# Patient Record
Sex: MALE | Race: WHITE | NOT HISPANIC OR LATINO | Employment: FULL TIME | URBAN - METROPOLITAN AREA
[De-identification: names, ages, dates, MRNs, and addresses within clinical notes are randomized per-mention and may not be internally consistent; named-entity substitution may affect disease eponyms.]

---

## 2017-08-18 ENCOUNTER — OFFICE VISIT - HEALTHEAST (OUTPATIENT)
Dept: FAMILY MEDICINE | Facility: CLINIC | Age: 32
End: 2017-08-18
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
## 2017-08-18 ASSESSMENT — MIFFLIN-ST. JEOR: SCORE: 1866.59

## 2017-08-21 ENCOUNTER — COMMUNICATION - HEALTHEAST (OUTPATIENT)
Dept: FAMILY MEDICINE | Facility: CLINIC | Age: 32
End: 2017-08-21

## 2018-02-17 ENCOUNTER — OFFICE VISIT - HEALTHEAST (OUTPATIENT)
Dept: FAMILY MEDICINE | Facility: CLINIC | Age: 33
End: 2018-02-17

## 2018-02-17 DIAGNOSIS — R05.8 POST-VIRAL COUGH SYNDROME: ICD-10-CM

## 2018-07-11 ENCOUNTER — OFFICE VISIT - HEALTHEAST (OUTPATIENT)
Dept: UROLOGY | Facility: CLINIC | Age: 33
End: 2018-07-11

## 2018-07-11 ENCOUNTER — HOSPITAL ENCOUNTER (OUTPATIENT)
Dept: CT IMAGING | Facility: CLINIC | Age: 33
Discharge: HOME | End: 2018-07-11
Attending: PHYSICIAN ASSISTANT

## 2018-07-11 DIAGNOSIS — N20.1 CALCULUS OF URETER: ICD-10-CM

## 2018-07-11 DIAGNOSIS — R10.9 RIGHT FLANK PAIN: ICD-10-CM

## 2018-07-11 DIAGNOSIS — N13.2 HYDRONEPHROSIS WITH URINARY OBSTRUCTION DUE TO URETERAL CALCULUS: ICD-10-CM

## 2018-07-11 DIAGNOSIS — N20.9 URINARY TRACT STONES: ICD-10-CM

## 2018-07-11 DIAGNOSIS — N20.0 CALCULUS OF KIDNEY: ICD-10-CM

## 2018-07-11 LAB
ALBUMIN UR-MCNC: NEGATIVE MG/DL
APPEARANCE UR: CLEAR
BILIRUB UR QL STRIP: NEGATIVE
COLOR UR AUTO: YELLOW
GLUCOSE UR STRIP-MCNC: NEGATIVE MG/DL
HGB UR QL STRIP: ABNORMAL
KETONES UR STRIP-MCNC: NEGATIVE MG/DL
LEUKOCYTE ESTERASE UR QL STRIP: NEGATIVE
NITRATE UR QL: NEGATIVE
PH UR STRIP: 5.5 [PH] (ref 5–8)
SP GR UR STRIP: 1.02 (ref 1–1.03)
UROBILINOGEN UR STRIP-ACNC: ABNORMAL

## 2018-07-15 ENCOUNTER — COMMUNICATION - HEALTHEAST (OUTPATIENT)
Dept: SCHEDULING | Facility: CLINIC | Age: 33
End: 2018-07-15

## 2018-07-16 ENCOUNTER — OFFICE VISIT - HEALTHEAST (OUTPATIENT)
Dept: UROLOGY | Facility: CLINIC | Age: 33
End: 2018-07-16

## 2018-07-16 ENCOUNTER — HOSPITAL ENCOUNTER (OUTPATIENT)
Dept: CT IMAGING | Facility: CLINIC | Age: 33
Discharge: HOME | End: 2018-07-16
Attending: PHYSICIAN ASSISTANT

## 2018-07-16 DIAGNOSIS — N13.2 HYDRONEPHROSIS WITH URINARY OBSTRUCTION DUE TO URETERAL CALCULUS: ICD-10-CM

## 2018-07-16 DIAGNOSIS — N20.1 CALCULUS OF URETER: ICD-10-CM

## 2018-07-16 DIAGNOSIS — N20.0 CALCULUS OF KIDNEY: ICD-10-CM

## 2018-07-16 DIAGNOSIS — N20.9 URINARY TRACT STONES: ICD-10-CM

## 2018-07-16 LAB
ALBUMIN UR-MCNC: NEGATIVE MG/DL
APPEARANCE UR: CLEAR
BILIRUB UR QL STRIP: NEGATIVE
COLOR UR AUTO: YELLOW
GLUCOSE UR STRIP-MCNC: NEGATIVE MG/DL
HGB UR QL STRIP: ABNORMAL
KETONES UR STRIP-MCNC: ABNORMAL MG/DL
LEUKOCYTE ESTERASE UR QL STRIP: ABNORMAL
NITRATE UR QL: NEGATIVE
PH UR STRIP: 5.5 [PH] (ref 5–8)
SP GR UR STRIP: <=1.005 (ref 1–1.03)
UROBILINOGEN UR STRIP-ACNC: ABNORMAL

## 2019-01-23 ENCOUNTER — RECORDS - HEALTHEAST (OUTPATIENT)
Dept: ADMINISTRATIVE | Facility: OTHER | Age: 34
End: 2019-01-23

## 2019-02-26 ENCOUNTER — OFFICE VISIT - HEALTHEAST (OUTPATIENT)
Dept: FAMILY MEDICINE | Facility: CLINIC | Age: 34
End: 2019-02-26

## 2019-02-26 DIAGNOSIS — K21.9 GASTROESOPHAGEAL REFLUX DISEASE WITHOUT ESOPHAGITIS: ICD-10-CM

## 2019-02-26 ASSESSMENT — MIFFLIN-ST. JEOR: SCORE: 1884.73

## 2019-03-21 ENCOUNTER — OFFICE VISIT - HEALTHEAST (OUTPATIENT)
Dept: UROLOGY | Facility: CLINIC | Age: 34
End: 2019-03-21

## 2019-03-21 DIAGNOSIS — N23 RENAL COLIC: ICD-10-CM

## 2019-03-21 DIAGNOSIS — N20.1 CALCULUS OF URETER: ICD-10-CM

## 2019-03-21 DIAGNOSIS — N20.0 CALCULUS OF KIDNEY: ICD-10-CM

## 2019-04-04 ENCOUNTER — HOSPITAL ENCOUNTER (OUTPATIENT)
Dept: CT IMAGING | Facility: CLINIC | Age: 34
Discharge: HOME | End: 2019-04-04
Attending: PHYSICIAN ASSISTANT

## 2019-04-04 ENCOUNTER — OFFICE VISIT - HEALTHEAST (OUTPATIENT)
Dept: UROLOGY | Facility: CLINIC | Age: 34
End: 2019-04-04

## 2019-04-04 DIAGNOSIS — N20.1 CALCULUS OF URETER: ICD-10-CM

## 2019-04-04 DIAGNOSIS — N20.0 CALCULUS OF KIDNEY: ICD-10-CM

## 2019-04-04 DIAGNOSIS — N13.2 HYDRONEPHROSIS WITH URINARY OBSTRUCTION DUE TO URETERAL CALCULUS: ICD-10-CM

## 2019-04-04 LAB
ALBUMIN UR-MCNC: NEGATIVE MG/DL
APPEARANCE UR: CLEAR
BILIRUB UR QL STRIP: NEGATIVE
COLOR UR AUTO: YELLOW
GLUCOSE UR STRIP-MCNC: NEGATIVE MG/DL
HGB UR QL STRIP: NEGATIVE
KETONES UR STRIP-MCNC: ABNORMAL MG/DL
LEUKOCYTE ESTERASE UR QL STRIP: NEGATIVE
NITRATE UR QL: NEGATIVE
PH UR STRIP: 5.5 [PH] (ref 5–8)
SP GR UR STRIP: 1.02 (ref 1–1.03)
UROBILINOGEN UR STRIP-ACNC: ABNORMAL

## 2019-04-18 ENCOUNTER — OFFICE VISIT - HEALTHEAST (OUTPATIENT)
Dept: UROLOGY | Facility: CLINIC | Age: 34
End: 2019-04-18

## 2019-04-18 ENCOUNTER — HOSPITAL ENCOUNTER (OUTPATIENT)
Dept: CT IMAGING | Facility: CLINIC | Age: 34
Discharge: HOME | End: 2019-04-18
Attending: PHYSICIAN ASSISTANT

## 2019-04-18 DIAGNOSIS — N13.2 HYDRONEPHROSIS WITH URINARY OBSTRUCTION DUE TO URETERAL CALCULUS: ICD-10-CM

## 2019-04-18 DIAGNOSIS — N20.1 CALCULUS OF URETER: ICD-10-CM

## 2019-04-18 LAB
ALBUMIN UR-MCNC: NEGATIVE MG/DL
APPEARANCE UR: CLEAR
BILIRUB UR QL STRIP: NEGATIVE
COLOR UR AUTO: YELLOW
GLUCOSE UR STRIP-MCNC: NEGATIVE MG/DL
HGB UR QL STRIP: NEGATIVE
KETONES UR STRIP-MCNC: NEGATIVE MG/DL
LEUKOCYTE ESTERASE UR QL STRIP: NEGATIVE
NITRATE UR QL: NEGATIVE
PH UR STRIP: 5.5 [PH] (ref 5–8)
SP GR UR STRIP: 1.01 (ref 1–1.03)
UROBILINOGEN UR STRIP-ACNC: NORMAL

## 2019-04-19 ENCOUNTER — ANESTHESIA - HEALTHEAST (OUTPATIENT)
Dept: SURGERY | Facility: CLINIC | Age: 34
End: 2019-04-19

## 2019-04-19 ENCOUNTER — SURGERY - HEALTHEAST (OUTPATIENT)
Dept: SURGERY | Facility: CLINIC | Age: 34
End: 2019-04-19

## 2019-04-19 ASSESSMENT — MIFFLIN-ST. JEOR: SCORE: 1821.23

## 2019-04-30 ENCOUNTER — ANESTHESIA - HEALTHEAST (OUTPATIENT)
Dept: SURGERY | Facility: CLINIC | Age: 34
End: 2019-04-30

## 2019-04-30 ENCOUNTER — SURGERY - HEALTHEAST (OUTPATIENT)
Dept: SURGERY | Facility: CLINIC | Age: 34
End: 2019-04-30

## 2019-04-30 ASSESSMENT — MIFFLIN-ST. JEOR: SCORE: 1814.71

## 2019-05-03 ENCOUNTER — COMMUNICATION - HEALTHEAST (OUTPATIENT)
Dept: UROLOGY | Facility: CLINIC | Age: 34
End: 2019-05-03

## 2019-05-03 ENCOUNTER — COMMUNICATION - HEALTHEAST (OUTPATIENT)
Dept: SCHEDULING | Facility: CLINIC | Age: 34
End: 2019-05-03

## 2019-05-04 ENCOUNTER — COMMUNICATION - HEALTHEAST (OUTPATIENT)
Dept: UROLOGY | Facility: CLINIC | Age: 34
End: 2019-05-04

## 2019-05-06 ENCOUNTER — AMBULATORY - HEALTHEAST (OUTPATIENT)
Dept: UROLOGY | Facility: CLINIC | Age: 34
End: 2019-05-06

## 2019-05-06 DIAGNOSIS — N20.1 CALCULUS OF URETER: ICD-10-CM

## 2019-05-06 LAB
ALBUMIN UR-MCNC: ABNORMAL MG/DL
APPEARANCE UR: CLEAR
BILIRUB UR QL STRIP: NEGATIVE
COLOR UR AUTO: YELLOW
GLUCOSE UR STRIP-MCNC: NEGATIVE MG/DL
HGB UR QL STRIP: ABNORMAL
KETONES UR STRIP-MCNC: NEGATIVE MG/DL
LEUKOCYTE ESTERASE UR QL STRIP: ABNORMAL
NITRATE UR QL: NEGATIVE
PH UR STRIP: 6 [PH] (ref 5–8)
SP GR UR STRIP: <=1.005 (ref 1–1.03)
UROBILINOGEN UR STRIP-ACNC: ABNORMAL

## 2019-05-07 LAB — BACTERIA SPEC CULT: NO GROWTH

## 2019-06-10 ENCOUNTER — OFFICE VISIT - HEALTHEAST (OUTPATIENT)
Dept: UROLOGY | Facility: CLINIC | Age: 34
End: 2019-06-10

## 2019-06-10 ENCOUNTER — HOSPITAL ENCOUNTER (OUTPATIENT)
Dept: CT IMAGING | Facility: CLINIC | Age: 34
Discharge: HOME | End: 2019-06-10
Attending: SPECIALIST

## 2019-06-10 DIAGNOSIS — N20.0 CALCULUS OF KIDNEY: ICD-10-CM

## 2019-06-10 DIAGNOSIS — N20.1 CALCULUS OF URETER: ICD-10-CM

## 2019-06-10 LAB
ALBUMIN UR-MCNC: NEGATIVE MG/DL
APPEARANCE UR: CLEAR
BILIRUB UR QL STRIP: NEGATIVE
COLOR UR AUTO: YELLOW
GLUCOSE UR STRIP-MCNC: NEGATIVE MG/DL
HGB UR QL STRIP: NEGATIVE
KETONES UR STRIP-MCNC: NEGATIVE MG/DL
LEUKOCYTE ESTERASE UR QL STRIP: NEGATIVE
NITRATE UR QL: NEGATIVE
PH UR STRIP: 5.5 [PH] (ref 5–8)
SP GR UR STRIP: 1.02 (ref 1–1.03)
UROBILINOGEN UR STRIP-ACNC: NORMAL

## 2020-05-21 ENCOUNTER — OFFICE VISIT - HEALTHEAST (OUTPATIENT)
Dept: FAMILY MEDICINE | Facility: CLINIC | Age: 35
End: 2020-05-21

## 2020-05-21 DIAGNOSIS — R45.89 DEPRESSED MOOD: ICD-10-CM

## 2020-05-21 DIAGNOSIS — K42.9 UMBILICAL HERNIA WITHOUT OBSTRUCTION AND WITHOUT GANGRENE: ICD-10-CM

## 2020-05-21 DIAGNOSIS — I83.92 VARICOSE VEINS OF LEFT LOWER EXTREMITY, UNSPECIFIED WHETHER COMPLICATED: ICD-10-CM

## 2020-05-21 DIAGNOSIS — L72.3 SEBACEOUS CYST: ICD-10-CM

## 2020-05-21 ASSESSMENT — ANXIETY QUESTIONNAIRES
3. WORRYING TOO MUCH ABOUT DIFFERENT THINGS: MORE THAN HALF THE DAYS
6. BECOMING EASILY ANNOYED OR IRRITABLE: MORE THAN HALF THE DAYS
2. NOT BEING ABLE TO STOP OR CONTROL WORRYING: MORE THAN HALF THE DAYS
IF YOU CHECKED OFF ANY PROBLEMS ON THIS QUESTIONNAIRE, HOW DIFFICULT HAVE THESE PROBLEMS MADE IT FOR YOU TO DO YOUR WORK, TAKE CARE OF THINGS AT HOME, OR GET ALONG WITH OTHER PEOPLE: SOMEWHAT DIFFICULT
GAD7 TOTAL SCORE: 11
5. BEING SO RESTLESS THAT IT IS HARD TO SIT STILL: MORE THAN HALF THE DAYS
1. FEELING NERVOUS, ANXIOUS, OR ON EDGE: MORE THAN HALF THE DAYS
7. FEELING AFRAID AS IF SOMETHING AWFUL MIGHT HAPPEN: NOT AT ALL
4. TROUBLE RELAXING: SEVERAL DAYS

## 2020-05-21 ASSESSMENT — PATIENT HEALTH QUESTIONNAIRE - PHQ9: SUM OF ALL RESPONSES TO PHQ QUESTIONS 1-9: 6

## 2020-05-21 ASSESSMENT — MIFFLIN-ST. JEOR: SCORE: 1929.18

## 2020-05-21 NOTE — ASSESSMENT & PLAN NOTE
Patient describes some mild long term symptoms but he has usually been able to manage with lifestyle. I suspect symptoms have worseneddue to stress related to ongoing pandemic. We discussed that alcohol acts chemically as a depressant and is likely causing worsening of his symptoms. I am recommending cessation of alcohol use until his symptoms have improved and then a limit of 7-14 drinks per week. He feels he can do this without any outside assistance. We discussed treatment options. He is not interested in counseling. Start sertraline 25 mg daily for 7 days, then 50mg daily. He was also given information on lifestyle changes. Follow up in 8 weeks.

## 2020-06-19 ENCOUNTER — OFFICE VISIT - HEALTHEAST (OUTPATIENT)
Dept: OTOLARYNGOLOGY | Facility: CLINIC | Age: 35
End: 2020-06-19

## 2020-06-19 ENCOUNTER — OFFICE VISIT - HEALTHEAST (OUTPATIENT)
Dept: SURGERY | Facility: CLINIC | Age: 35
End: 2020-06-19

## 2020-06-19 DIAGNOSIS — K42.9 UMBILICAL HERNIA WITHOUT OBSTRUCTION AND WITHOUT GANGRENE: ICD-10-CM

## 2020-06-19 DIAGNOSIS — L72.3 SEBACEOUS CYST: ICD-10-CM

## 2020-06-22 ENCOUNTER — COMMUNICATION - HEALTHEAST (OUTPATIENT)
Dept: SURGERY | Facility: CLINIC | Age: 35
End: 2020-06-22

## 2020-06-22 ENCOUNTER — COMMUNICATION - HEALTHEAST (OUTPATIENT)
Dept: OTOLARYNGOLOGY | Facility: CLINIC | Age: 35
End: 2020-06-22

## 2020-06-22 DIAGNOSIS — T81.40XA POST OP INFECTION: ICD-10-CM

## 2020-06-23 ENCOUNTER — COMMUNICATION - HEALTHEAST (OUTPATIENT)
Dept: SURGERY | Facility: CLINIC | Age: 35
End: 2020-06-23

## 2020-06-23 ENCOUNTER — AMBULATORY - HEALTHEAST (OUTPATIENT)
Dept: SURGERY | Facility: CLINIC | Age: 35
End: 2020-06-23

## 2020-06-23 ENCOUNTER — COMMUNICATION - HEALTHEAST (OUTPATIENT)
Dept: ADMINISTRATIVE | Facility: CLINIC | Age: 35
End: 2020-06-23

## 2020-06-23 DIAGNOSIS — Z11.59 ENCOUNTER FOR SCREENING FOR OTHER VIRAL DISEASES: ICD-10-CM

## 2020-06-23 LAB
LAB AP CHARGES (HE HISTORICAL CONVERSION): NORMAL
PATH REPORT.COMMENTS IMP SPEC: NORMAL
PATH REPORT.FINAL DX SPEC: NORMAL
PATH REPORT.GROSS SPEC: NORMAL
PATH REPORT.MICROSCOPIC SPEC OTHER STN: NORMAL
PATH REPORT.RELEVANT HX SPEC: NORMAL
RESULT FLAG (HE HISTORICAL CONVERSION): NORMAL

## 2020-06-24 ENCOUNTER — OFFICE VISIT - HEALTHEAST (OUTPATIENT)
Dept: OTOLARYNGOLOGY | Facility: CLINIC | Age: 35
End: 2020-06-24

## 2020-06-24 DIAGNOSIS — L03.221 CELLULITIS OF NECK: ICD-10-CM

## 2020-06-26 ENCOUNTER — COMMUNICATION - HEALTHEAST (OUTPATIENT)
Dept: SURGERY | Facility: CLINIC | Age: 35
End: 2020-06-26

## 2021-05-26 ASSESSMENT — PATIENT HEALTH QUESTIONNAIRE - PHQ9: SUM OF ALL RESPONSES TO PHQ QUESTIONS 1-9: 6

## 2021-05-27 NOTE — PATIENT INSTRUCTIONS - HE
Patient Stated Goal: Know what to expect after surgery  Ureteroscopy    Ureteroscopy is a procedure which is done for clearance of stones from the ureter, kidney or both. There are no incisions involved. The procedure involves your surgeon placing a small scope into your urethra. This is the opening where urine leaves your body.  The surgeon watches as they carefully guide the scope to the stone(s).  Modern flexible ureteroscopes can be used to reach virtually any location within the urinary tract.     The size, shape and location of the stone determines how best to treat the stone(s).  Whenever possible, stones are removed in one piece.  Larger stones need to be broken using a laser before removing in smaller pieces.  The goal is to remove all stones and stone fragments from that side of the body in a single treatment.  Complete stone clearance is an important step to prevent future kidney stone episodes.    Surgery:    Same day outpatient procedure    30-60 minutes    Procedure done in hospital surgical suite    General anesthesia (you will be asleep during the procedure)     Antibiotic prior to surgery to prevent infection    Physician will visit with you and respond to any questions or concerns and consent will be signed prior to going to the operating room    Risks:    Infection - Preoperative antibiotics should prevent new infections but it is possible that unanticipated bacteria may be introduced at time of surgery or that the stones were actually chronically infected before surgery      Injury - The ureter may be injured during this procedure.  This is most likely to happen if the ureter was very inflamed before surgery or if a stone is very tightly impacted.  The surgeon will not aggressively treat a stone if this creates a risk of injury.        Inaccessible Stones -A single procedure is effective in 95% of cases, but if your ureter is very narrow or your kidney stone is very impacted, a stent will be  placed and the procedure stopped.  In 1-2 weeks after the ureter has relaxed, the patient will be brought back to surgery and the procedure can be safely performed.      Incomplete stone clearance -Occasionally stone or stone fragments may not be completely cleared.  These may pass on their own, which may cause discomfort.  Our goal is to remove all possible stones and fragments.    Stent:      An internal soft tube will be placed between the kidney and the bladder while in surgery (after the stone is cleared). The stent will keep the kidney draining.    What should I expect?     It is common for a stent to cause some irritation and discomfort.   You may have:      The need to urinate suddenly     The need to urinate often     Pain during urination     A dull backache, which may get worse during urination     Blood stained urine (like fruit punch) and occasional small clots    It s important to remember the stent is necessary and only temporary. To feel more comfortable:      Drink more than you normally would but you do not have to constantly  flush your kidneys     Limiting your activity may decrease irritation or bleeding    Ibuprofen - 2 tablets every 6-8 hours     Use pain medications as directed.    When is the stent removed?    Most stents are removed within 5 days to 2 weeks after a procedure.     How is the stent removed?     Your stent will be removed in [facility] with a small telescope and a grasping tool.  It usually takes less than 1 minute to remove the stent.    What should I expect after the stent is removed?     You should feel normal by the next day    Some patients find:    An increase in back pain about an hour after the stent is removed as the kidney fills up with urine before it starts to empty.  It can be as uncomfortable as your initial stone episode.  Taking pain medications before stent removal may be helpful, but you would need someone else to drive you to and from your  appointment.    Bladder symptoms usually disappear by the next morning.    Small amounts of blood in the urine may be seen occasionally for up to a week.    Diet:      After surgery, there are no dietary restrictions - Drink to thirst, there is no need to increase intake of fluids, as this may increase nausea symptoms. Try to eat smaller, more frequent snacks, instead of large meals.    Activity:    Many people return to work within 1-2 days. Fatigue is normal for a couple of weeks following surgery. With increased activity you may experience more discomfort and you may notice more blood in your urine.      Post-Operative Symptom Control    While you recover from your procedure, you can take steps to ease your recovery.    Medications that prevent further episodes of severe pain and help stones pass: Take these as prescribed on a regular basis even if you are NOT in pain      Ibuprofen (Advil or Motrin) - Is available over the counter Take 2 (200mg) tablets every 6 hours until the stone passes.  o prevents spasm of the ureter.    o Decreases pain      Dramamine - (drowsy version, non-generic formulation) Is available over the counter and decreases spasm of the ureter.  Take 50mg at bedtime every night until the stone passes. In addition, take every 6 hours as needed.  Dramamine:  o Decreases nausea  o Decreases acute pain  o Decreases recurrence of pain for next 24 hours  o Will help you sleep        *This medication will cause increased drowsiness, do not drive or operate machinery for 6 hours      Flomax- Studies show that Flomax decreases irritation from stents.   o Take every day with food until stone passes even if you do not have pain  o Flomax does not relieve pain.        *This medication may cause nasal congestion or light-headedness      Detrol ( Tolterodine) - After surgery Detrol may decrease stent irritation and pelvic pain  o Take as prescribed     *This medication may cause dry mouth, constipation or  blurry vision. Stop medication if unable to urinate.    Medication that are taken as needed to manage break through symptoms: Take these ONLY as required and hopefully not at all      Narcotics (Percocet, Vicodin, Dilaudid)- take as prescribed for severe pain unrelieved by ibuprofen and dramamine  o Take as prescribed for severe pain  o Narcotics have significant side effects and only  cover-up  pain. They have no effect on cause of pain.  o Common side effects:  - Confusion, disorientation and sedation - DO NOT DRIVE OR OPERATE MACHINERY WITHIN 24 HOURS  - Nausea - take Dramamine or Zofran  or Haldol to help control  - Constipation  - Sleep disturbances      Ondansetron (Zofran)-  o Take as prescribed  o Reserve for severe nausea  o May cause constipation, start over the counter Miralax if needed to treat this    Haldol-  o Take as prescribed  o Reserve for severe nausea    Warning Signs/Symptoms - Please call the [facility] 24 hours a day at [phone] IMMEDIATELY if you experience any of these:    Fever greater than 100.1     Chills    Pain NOT CONTROLLED by pain medications    Heavy bleeding or large clots in urine (small clots can be normal)    Persistent nausea and/or vomiting    Post-Operative Follow up:    The stone(s) will be sent from surgery to a lab for composition analysis.  These results are usually available before a one month post-operative visit.  If you had laser treatment to break up your stone, you will usually be scheduled for a low dose CT scan prior to your one month appointment.  This scan allows your surgeon to confirm that all stone fragments were cleared at time of surgery and that there have been no complications.  These results along with possible labs and urine studies will help us develop an individualized plan to prevent new stones from forming and keep existing stones from enlarging.  This visit is usually scheduled about 1 month after your original surgery.    The  [facility] can respond to your questions or concerns 24 hours a day at [phone].

## 2021-05-27 NOTE — PROGRESS NOTES
Assessment/Plan:        Diagnoses and all orders for this visit:    Calculus of ureter  -     Urinalysis Macroscopic  -     Symptom Control While Passing a Stone Education  -     CT Abdomen Pelvis Without Oral Without IV Contrast; Future; Expected date: 04/18/2019  -     Patient Stated Goal: Pass my stone  -     oxyCODONE (ROXICODONE) 5 MG immediate release tablet; 1-2 tablets every four hours as required for severe pain  Dispense: 20 tablet; Refill: 0  -     tamsulosin (FLOMAX) 0.4 mg cap; 1 tablet (0.4 mg) daily with food  Dispense: 14 capsule; Refill: 2    Calculus of kidney    Hydronephrosis with urinary obstruction due to ureteral calculus      Stone Management Plan  [facility] Stone [facility] 7/16/2018 3/21/2019 4/4/2019   Urinary Tract Infection No suspicion of infection No suspicion of infection No suspicion of infection   Renal Colic Asymptomatic at this time Inadequate outpatient management of symptoms Well controlled symptoms   Renal Failure No suspicion of renal failure No suspicion of renal failure No suspicion of renal failure   Current CT date 7/16/2018 3/20/2019 4/4/2019   Right sided stones? Yes Yes No   R Number of ureteral stones 1 No ureteral stones No ureteral stones   R GSD of ureteral stones 3 - -   R Location of ureteral stone Distal - -   R Number of kidney stones  Renal stones unchanged from last exam Renal stones unchanged from last exam -   R GSD of kidney stones < 2 < 2 -   R Hydronephrosis Mild None None   R Stone Event Established event No current event No current event   Diagnosis date - - -   Initial location of primary symptomatic stone - - -   Initial GSD of primary symptomatic stone - - -   R MET status Progression - -   R Current Plan MET Observe -   MET 2 week F/U - -   Observe rationale - Limited stone burden with good prognosis for spontaneous passage -   Left sided stones? Yes Yes Yes   L Number of ureteral stones No ureteral stones 1 2   L GSD of ureteral stones - 5 5   L Location  of ureteral stone - Proximal Proximal   L Number of kidney stones  Renal stones unchanged from last exam 2 1   L GSD of kidney stones 4 - 10 2 - 4 < 2   L Hydronephrosis None Mild Mild   L Stone Event No current event New event Established event   Diagnosis date - 3/20/2019 -   Initial location of primary symptomatic stone - Proximal -   Initial GSD of primary symptomatic stone - 5 -   L MET Status - Initiation No progression   L Current Plan Observe MET MET   MET - 2 week F/U 2 week F/U   Observe rationale Limited stone burden with good prognosis for spontaneous passage - -         Subjective:      HPI  Mr. Segundo Wilson is a 33 y.o.  male returning to the [facility] for medical expulsive therapy follow up.     On last encounter, his 5 mm stone was in left proximal ureter with mild hydronephrosis. He has had no unanticipated events.    Symptoms have been controlled. He had some pain episodes over the weekend but has not seen a stone pass. He is asymptomatic at present. He denies symptoms of fever, chills, flank pain, nausea, vomiting, urinary frequency and dysuria.     New CT scan was personally reviewed and now demonstrates 2 left ureteral stones. There has been no progression of previous 5 mm proximal stone. Also, previous 3 mm left lower pole stone now within proximal ureter. Solitary 1 mm left lower pole renal stone. No right sided stones appreciated.    PLAN    34 yo M with hx of rapidly recurrent calcium oxalate stone disease. Interval migration of left renal stone, now with 2 left proximal ureteral stones, stable associated hydronephrosis. Small, bilateral renal stones.    He will continue to attempt to pass stone and will return in 2 weeks with further imaging.     Patient also seen and examined by DARREN Bill   Review of systems is negative except for HPI.    Past Medical History:   Diagnosis Date     GERD (gastroesophageal reflux disease)      Kidney stones         Past Surgical History:   Procedure Laterality Date     SC ESOPHAGOGASTRIC FUNDOPLASTY      Description: Esophagogastric Fundoplasty Nissen Fundoplication;  Recorded: 03/18/2009;       Current Outpatient Medications   Medication Sig Dispense Refill     dimenhyDRINATE (DRAMAMINE) 50 MG tablet Take 50 mg by mouth at bedtime as needed.       ibuprofen (ADVIL,MOTRIN) 400 MG tablet Take 400 mg by mouth every 6 (six) hours as needed for pain.       oxyCODONE (ROXICODONE) 5 MG immediate release tablet Take 1-2 tablets (5-10 mg total) by mouth every 4 (four) hours as needed for pain. 10 tablet 0     tamsulosin (FLOMAX) 0.4 mg cap Take 1 capsule (0.4 mg total) by mouth daily for 14 days. 14 capsule 1     oxyCODONE (ROXICODONE) 5 MG immediate release tablet 1-2 tablets every four hours as required for severe pain 20 tablet 0     ranitidine (ZANTAC) 150 MG tablet Take 1 tablet (150 mg total) by mouth 2 (two) times a day. 180 tablet 1     tamsulosin (FLOMAX) 0.4 mg cap 1 tablet (0.4 mg) daily with food 14 capsule 2     No current facility-administered medications for this visit.        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     Employer: [employer]   Social Needs     Financial resource strain: Not on file     Food insecurity:     Worry: Not on file     Inability: Not on file     Transportation needs:     Medical: Not on file     Non-medical: Not on file   Tobacco Use     Smoking status: Never Smoker     Smokeless tobacco: Never Used   Substance and Sexual Activity     Alcohol use: Yes     Comment: occ social drink     Drug use: No     Sexual activity: Yes     Partners: Female     Birth control/protection: None   Lifestyle     Physical activity:     Days per week: Not  on file     Minutes per session: Not on file     Stress: Not on file   Relationships     Social connections:     Talks on phone: Not on file     Gets together: Not on file     Attends Anabaptism service: Not on file     Active member of club or organization: Not on file     Attends meetings of clubs or organizations: Not on file     Relationship status: Not on file     Intimate partner violence:     Fear of current or ex partner: Not on file     Emotionally abused: Not on file     Physically abused: Not on file     Forced sexual activity: Not on file   Other Topics Concern     Not on file   Social History Narrative     Not on file       Family History   Problem Relation Age of Onset     Diabetes Father      Urolithiasis Father         single stone     Clotting disorder Neg Hx      Gout Neg Hx      Cancer Neg Hx      Heart disease Neg Hx      Objective:      Physical Exam  Vitals:    04/04/19 0831   BP: (!) 136/98   Pulse: 65   Temp: 97.6  F (36.4  C)     General - well developed, well nourished, appropriate for age. Appears no distress at this time  Abdomen - soft, non-tender, no hepatosplenomegaly, no masses.   - no flank tenderness, no suprapubic tenderness, kidney and bladder non-palpable  MSK - normal spinal curvature. no spinal tenderness. normal gait. muscular strength intact.  Psych - oriented to time, place, and person, normal mood and affect.      Labs   Urinalysis POC (Office):  Blood UA   Date Value Ref Range Status   09/17/2015 Negative Negative Final     Nitrite, UA   Date Value Ref Range Status   04/04/2019 Negative Negative Final   03/20/2019 Negative Negative Final   07/16/2018 Negative Negative Final     Leukocytes, UA    Date Value Ref Range Status   09/17/2015 Negative Negative Final     pH UA   Date Value Ref Range Status   09/17/2015 5.5 4.5 - 8.0 Final       Lab Urinalysis:  Blood, UA   Date Value Ref Range Status   04/04/2019 Negative Negative Final   03/20/2019 Small (!) Negative Final    07/16/2018 Trace (!) Negative Final     Nitrite, UA   Date Value Ref Range Status   04/04/2019 Negative Negative Final   03/20/2019 Negative Negative Final   07/16/2018 Negative Negative Final     Leukocytes, UA   Date Value Ref Range Status   04/04/2019 Negative Negative Final   03/20/2019 Negative Negative Final   07/16/2018 Trace (!) Negative Final     pH, UA   Date Value Ref Range Status   04/04/2019 5.5 5.0 - 8.0 Final   03/20/2019 6.0 4.5 - 8.0 Final   07/16/2018 5.5 5.0 - 8.0 Final

## 2021-05-27 NOTE — PATIENT INSTRUCTIONS - HE
Patient Stated Goal: Pass my stone  Symptom Control While Passing A Stone    The goal of [facility] is to let a smaller kidney stone (less than 4 to 5 mm) pass without intervention if possible. Giving your body a chance to clear the stone may take a few hours up to a few weeks.  Keeping you well-informed, safe and fairly comfortable is important.    Drink to thirst  Do not attempt to  flush out  your stone by drinking too much fluid. This does not work and may increase nausea. Drink enough to satisfy your body s thirst. Eating your normal diet is fine.   Medications (that may be suggested or prescribed)    Ibuprofen (Advil or Motrin) Available over the counter  o Take two (200mg) tablets every six hours until the stone passes.  o Prevents spasm of the ureter.    o Decreases pain.      Dramamine* (drowsy version, non-generic formulation) Available over the counter  o Take 50mg at bedtime  o Decreases spasms of the ureter  o Decreases nausea  o Decreases acute pain  o Decreases recurrence of pain for 24 hours  o Will help you sleep  *This medication will cause increase drowsiness, do not drive or operate machinery for 6 hours.      Narcotics (Percocet, Vicodin, Dilaudid) Take as prescribed for severe pain unrelieved by ibuprofen and Dramamine  o Narcotics have significant side effects and only  cover-up  pain. They have no effect on the cause of pain.  o Common side effects  - Confusion, disorientation and sedation - DO NOT DRIVE OR OPERATE MACHINERY WITHIN 24 HOURS  - Nausea - take Dramamine or Zofran or Haldol to help control  - Constipation  - Sleep disturbances      Ondansetron (Zofran) Take as prescribed  o Reserve for severe nausea  o May cause constipation, start over the counter Miralax if needed      Second Line Anti-Nausea Medication: Adding a different anti-nausea medication maybe helpful for persistent nausea.  The combined effect of different types of anti-nausea medications maybe more  effective than either medication by itself, even in higher doses.  o Compazine: Take as prescribed      Information about kidney stones    Crystals can form if chemicals are too concentrated in your urine. If the crystal grows over time, a stone may form. A stone usually isn t painful while it is still in the kidney.    As the stone begins to leave the kidney, you may experience episodes of flank pain as the kidney stone approaches the entrance to the ureter. Some people feel a vague ache in the side.    Kidney stones may fall into the ureter. Some stones are tiny and pass through without causing symptoms. The ureter is a small tube (approximately 1/8 of an inch wide). A kidney stone can get stuck and block the ureter. If this happens, urine backs up and flows back to the kidney. Back pressure on the kidney can cause:  o Severe flank pain radiating to the groin.  o Severe nausea and vomiting.  o The pain can occur in the lower back, side, groin or all three.      When the stone reaches the lower ureter, this can irritate the bladder and sensations of feeling the urge to urinate frequently and urgently may occur.      Once the stone passes out of your ureter and into your bladder, the symptoms of urgency and frequency will often disappear. Sometimes pain will come back for a short period and will not be as severe as before. The passage of the stone from your bladder and out of your body is usually not a problem. The urethra is at least twice as wide as the normal ureter, so the stone doesn t usually block it.    Strain all urine  If you pass the stone, save it. Place it in the container we have provided and bring it to the [facility] within a week of passing it. Your stone will then be sent for analysis which takes about a month.     Signs and symptoms you might experience    Nausea    Decreased appetite    Urinary frequency    Bloody urine     Chills    Fatigue    When to call [facility] or  go to the Emergency Room    Fever with a temperature greater than 100.1    Severe pain    Persistent nausea/vomiting    If the pain worsens or nausea/vomiting is uncontrolled with medications, STOP eating & drinking. You need to have an empty stomach for 8 hours prior to surgery. Call the [facility] immediately at [phone].           Follow-up    Low dose CT scan with doctor visit 1-2 weeks after initial clinic visit per doctor s instructions    Please cancel the CT scan visit if you pass a stone. Reschedule for a one month follow-up with doctor to discuss stone composition and future prevention.    Preventing future stones    Approximately a month after your stone is sent out for analysis, a prevention visit will occur with your provider, to discuss an individualized plan for prevention of new stones and to discuss managing stones that you may still have. Along with the analysis of the kidney stone, blood and urine tests may be indicated to develop this plan. Knowing the type of kidney stones you make, and why, allows the providers at the [facility] to recommend specific ways to prevent them.    Follow-up visits are an important part of monitoring and preventing future re-occurrences.    The [facility] is available for questions or concerns 24 hours a day at [phone]

## 2021-05-27 NOTE — PROGRESS NOTES
Assessment/Plan:        Diagnoses and all orders for this visit:    Calculus of ureter  -     Urinalysis Macroscopic  -     Verify informed consent; Standing  -     Diet NPO; Standing  -     Place sequential compression device; Standing  -     XR Retrograde Pyelogram W or WO KUB Intraoperative; Standing  -     levoFLOXacin 500 mg/100 mL IVPB 500 mg (LEVAQUIN)  -     ketorolac injection 15 mg (TORADOL)  -     acetaminophen tablet 1,000 mg (TYLENOL)  -     sterile water IR irrigation solution 3,000 mL  -     Patient Stated Goal: Know what to expect after surgery  -     Ureteroscopy Education    Hydronephrosis with urinary obstruction due to ureteral calculus      Stone Management Plan  [facility] Stone Management 3/21/2019 4/4/2019 4/18/2019   Urinary Tract Infection No suspicion of infection No suspicion of infection No suspicion of infection   Renal Colic Inadequate outpatient management of symptoms Well controlled symptoms Asymptomatic at this time   Renal Failure No suspicion of renal failure No suspicion of renal failure No suspicion of renal failure   Current CT date 3/20/2019 4/4/2019 4/18/2019   Right sided stones? Yes No Yes   R Number of ureteral stones No ureteral stones No ureteral stones No ureteral stones   R GSD of ureteral stones - - -   R Location of ureteral stone - - -   R Number of kidney stones  Renal stones unchanged from last exam - 1   R GSD of kidney stones < 2 - < 2   R Hydronephrosis None None None   R Stone Event No current event No current event No current event   Diagnosis date - - -   Initial location of primary symptomatic stone - - -   Initial GSD of primary symptomatic stone - - -   R MET status - - -   R Current Plan Observe - Observe   MET - - -   Observe rationale Limited stone burden with good prognosis for spontaneous passage - Limited stone burden with good prognosis for spontaneous passage   Left sided stones? Yes Yes Yes   L Number of ureteral stones 1 2 3   L GSD of ureteral stones  5 5 5   L Location of ureteral stone Proximal Proximal Proximal   L Number of kidney stones  2 1 No renal stones   L GSD of kidney stones 2 - 4 < 2 N/A   L Hydronephrosis Mild Mild Mild   L Stone Event New event Established event Established event   Diagnosis date 3/20/2019 - -   Initial location of primary symptomatic stone Proximal - -   Initial GSD of primary symptomatic stone 5 - -   L MET Status Initiation No progression No progression   L Current Plan MET MET Clear   MET 2 week F/U 2 week F/U -   Clear rationale - - Poor prognosis   Observe rationale - - -             Subjective:      HPI  Mr. Segundo Wilson is a 33 y.o.  male returning to the [facility] for medical expulsive therapy follow up.     On last encounter, imaging noted 5 mm and 3 mm stones in left proximal ureter with mild hydronephrosis. He has had no unanticipated events.    Symptoms have been controlled with medication and he is able to carry on normal activities. He has some pain events this last week but has not seen any stones pass. He is asymptomatic at present. Pertinent negative current symptoms include:  fever, chills, right flank pain, nausea, vomiting, hematuria, urinary frequency and dysuria.     New CT scan was personally reviewed and demonstrates no progression of most distal left proximal ureteral stone with stable mild hydronephrosis. Previous 1 mm left lower pole renal stone has migrated now sitting within proximal left ureter ~ 1 cm above the persistent 2 proximal ureteral stones.    PLAN    32 yo M with hx of rapidly recurrent calcium oxalate stone disease.  Interval migration of another left renal stone, now with 3 left proximal ureteral stones, stable associated mild hydronephrosis. Tiny right renal stone.    He has failed adequate duration of medical expulsive therapy and will proceed to the operating room for ureteroscopic stone clearance. Risks and benefits were detailed of ureteroscopic  stone clearance including potential issues of urinary or systemic infection, ureteral injury, inaccessible stone, incomplete stone clearance, multiple surgeries, and stent related symptoms of urgency, frequency and hematuria. Current documentation is adequate for preoperative history and physical.     He has flomax refill available.    Patient also seen and examined by Lilliana Bowling PA-C       ROS   Review of systems is negative except for HPI.    Past Medical History:   Diagnosis Date     GERD (gastroesophageal reflux disease)      Kidney stones        Past Surgical History:   Procedure Laterality Date     NE ESOPHAGOGASTRIC FUNDOPLASTY      Description: Esophagogastric Fundoplasty Nissen Fundoplication;  Recorded: 03/18/2009;       Current Outpatient Medications   Medication Sig Dispense Refill     dimenhyDRINATE (DRAMAMINE) 50 MG tablet Take 50 mg by mouth at bedtime as needed.       ibuprofen (ADVIL,MOTRIN) 400 MG tablet Take 400 mg by mouth every 6 (six) hours as needed for pain.       ranitidine (ZANTAC) 150 MG tablet Take 1 tablet (150 mg total) by mouth 2 (two) times a day. 180 tablet 1     tamsulosin (FLOMAX) 0.4 mg cap 1 tablet (0.4 mg) daily with food 14 capsule 2     oxyCODONE (ROXICODONE) 5 MG immediate release tablet Take 1-2 tablets (5-10 mg total) by mouth every 4 (four) hours as needed for pain. 10 tablet 0     oxyCODONE (ROXICODONE) 5 MG immediate release tablet 1-2 tablets every four hours as required for severe pain 20 tablet 0     No current facility-administered medications for this visit.        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     Employer: [employer]   Social Needs     Financial resource strain: Not on file     Food insecurity:      Worry: Not on file     Inability: Not on file     Transportation needs:     Medical: Not on file     Non-medical: Not on file   Tobacco Use     Smoking status: Never Smoker     Smokeless tobacco: Never Used   Substance and Sexual Activity     Alcohol use: Yes     Comment: occ social drink     Drug use: No     Sexual activity: Yes     Partners: Female     Birth control/protection: None   Lifestyle     Physical activity:     Days per week: Not on file     Minutes per session: Not on file     Stress: Not on file   Relationships     Social connections:     Talks on phone: Not on file     Gets together: Not on file     Attends Confucianism service: Not on file     Active member of club or organization: Not on file     Attends meetings of clubs or organizations: Not on file     Relationship status: Not on file     Intimate partner violence:     Fear of current or ex partner: Not on file     Emotionally abused: Not on file     Physically abused: Not on file     Forced sexual activity: Not on file   Other Topics Concern     Not on file   Social History Narrative     Not on file       Family History   Problem Relation Age of Onset     Diabetes Father      Urolithiasis Father         single stone     Clotting disorder Neg Hx      Gout Neg Hx      Cancer Neg Hx      Heart disease Neg Hx      Objective:      Physical Exam  Vitals:    04/18/19 0830   BP: (!) 161/92   Pulse: 66   Temp: 97.8  F (36.6  C)     General - well developed, well nourished, appropriate for age. Appears no distress at this time   Heart - regular rate and rhythm, no murmur  Respiratory - normal effort, clear to auscultation, good air entry without adventitious noises  Abdomen - soft, non-tender, no hepatosplenomegaly, no masses.   - no flank tenderness, no suprapubic tenderness, kidney and bladder non-palpable  MSK - normal spinal curvature. no spinal tenderness. normal gait. muscular strength intact.  Psych - oriented to time, place, and person, normal mood  and affect.      Labs   Urinalysis POC (Office):  Blood UA   Date Value Ref Range Status   09/17/2015 Negative Negative Final     Nitrite, UA   Date Value Ref Range Status   04/18/2019 Negative Negative Final   04/04/2019 Negative Negative Final   03/20/2019 Negative Negative Final     Leukocytes, UA    Date Value Ref Range Status   09/17/2015 Negative Negative Final     pH UA   Date Value Ref Range Status   09/17/2015 5.5 4.5 - 8.0 Final       Lab Urinalysis:  Blood, UA   Date Value Ref Range Status   04/18/2019 Negative Negative Final   04/04/2019 Negative Negative Final   03/20/2019 Small (!) Negative Final     Nitrite, UA   Date Value Ref Range Status   04/18/2019 Negative Negative Final   04/04/2019 Negative Negative Final   03/20/2019 Negative Negative Final     Leukocytes, UA   Date Value Ref Range Status   04/18/2019 Negative Negative Final   04/04/2019 Negative Negative Final   03/20/2019 Negative Negative Final     pH, UA   Date Value Ref Range Status   04/18/2019 5.5 5.0 - 8.0 Final   04/04/2019 5.5 5.0 - 8.0 Final   03/20/2019 6.0 4.5 - 8.0 Final

## 2021-05-28 ENCOUNTER — RECORDS - HEALTHEAST (OUTPATIENT)
Dept: ADMINISTRATIVE | Facility: CLINIC | Age: 36
End: 2021-05-28

## 2021-05-28 ASSESSMENT — ANXIETY QUESTIONNAIRES: GAD7 TOTAL SCORE: 11

## 2021-05-28 NOTE — ANESTHESIA PREPROCEDURE EVALUATION
Anesthesia Evaluation      No history of anesthetic complications     Airway   Mallampati: II  Neck ROM: full   Pulmonary - negative ROS and normal exam    breath sounds clear to auscultation                         Cardiovascular - negative ROS  Exercise tolerance: > or = 4 METS  Rhythm: regular  Rate: normal,         Neuro/Psych - negative ROS     Endo/Other - negative ROS   (-) no obesity     GI/Hepatic/Renal    (+) GERD,   chronic renal disease (nephrolithiasis s/f cysto, laser lithotripsy),           Dental    (+) bridge                       Anesthesia Plan  Planned anesthetic: general LMA  General LMA 4, unless ETT required by surgeon  Decadron 10, Zofran 4 for antiemesis   ASA 2   Induction: intravenous     Anesthesia plan special considerations: antiemetics,   Post-op plan: routine recovery

## 2021-05-28 NOTE — TELEPHONE ENCOUNTER
"He had a cystoscopy, left ureteroscopy laser lithotripsy. Stent insertion on 4/30/19.  Tonight he developed a fever of 102.1.  He has a headache and sweats.  Pain is mild to moderate.  On-call paged.  Per on-call he is to go to [facility] for urine culture and antibiotics.  Patient notified.  China Kim RN, BAN, Nurse Advisor, [place] 11p-7a    Reason for Disposition    Fever > 100.5 F (38.1 C)    Answer Assessment - Initial Assessment Questions  1. SYMPTOM: \"What's the main symptom you're concerned about?\" (e.g., pain, fever, vomiting)      Fever 102.0 oral  2. ONSET: \"When did fever  start?\"      Just note within the hour   3. SURGERY: \"What surgery was performed?\"      Cystoscopy, left ureterscopy, lithotripsy and stent placement  4. DATE of SURGERY: \"When was surgery performed?\"       4/30/19 Tuesday  5. ANESTHESIA: \" What type of anesthesia did you have?\" (e.g., general, spinal, epidural, local)      LMA  6. PAIN: \"Is there any pain?\" If so, ask: \"How bad is it?\"  (Scale 1-10; or mild, moderate, severe)      Not, really mild to moderate  7. FEVER: \"Do you have a fever?\" If so, ask: \"What is your temperature, how was it measured, and when did it start?\"      Yes 102.1  8. VOMITING: \"Is there any vomiting?\" If yes, ask: \"How many times?\"      No  9. BLEEDING: \"Is there any bleeding?\" If so, ask: \"How much?\" and \"Where?\"      No  10. OTHER SYMPTOMS: \"Do you have any other symptoms?\" (e.g., drainage from wound, painful urination, constipation)        Headache and sweating    Protocols used: POST-OP SYMPTOMS AND [id number]    "

## 2021-05-28 NOTE — ANESTHESIA CARE TRANSFER NOTE
Last vitals:   Vitals:    04/19/19 1013   BP: 129/79   Pulse: 87   Resp: 16   Temp: 36.3  C (97.4  F)   SpO2: 100%     Patient's level of consciousness is awake and drowsy  Spontaneous respirations: yes  Maintains airway independently: yes  Dentition unchanged: yes  Oropharynx: oropharynx clear of all foreign objects    QCDR Measures:  ASA# 20 - Surgical Safety Checklist: WHO surgical safety checklist completed prior to induction    PQRS# 430 - Adult PONV Prevention: 4558F - Pt received => 2 anti-emetic agents (different classes) preop & intraop  ASA# 8 - Peds PONV Prevention: NA - Not pediatric patient, not GA or 2 or more risk factors NOT present  PQRS# 424 - Mandie-op Temp Management: 4559F - At least one body temp DOCUMENTED => 35.5C or 95.9F within required timeframe  PQRS# 426 - PACU Transfer Protocol: - Transfer of care checklist used  ASA# 14 - Acute Post-op Pain: ASA14B - Patient did NOT experience pain >= 7 out of 10

## 2021-05-28 NOTE — TELEPHONE ENCOUNTER
32 yo M s/p staged left ureteroscopy for multiple ureteral stones and intraop finding of urethral stricture s/p dilation.     F/u from ER 5/3/19 for postoperative fever 102 F     He is doing well now but is tired as he has minimal sleep.    He got initiated on levaquin     CRP was 5.1 without leukocytosis, urine culture pending    He has appt scheduled for extraction of stent 5/6/19 which may need to be delayed if infection documented.    Will correspond over weekend when culture results finalized.    Segundo is aware of indications to return to ER/call. Patient understands and agrees with plan

## 2021-05-28 NOTE — ANESTHESIA PREPROCEDURE EVALUATION
Anesthesia Evaluation      Patient summary reviewed   No history of anesthetic complications     Airway   Mallampati: II  Neck ROM: full   Pulmonary - negative ROS    breath sounds clear to auscultation  (-) rhonchi, wheezes, rales, stridor                         Cardiovascular   Exercise tolerance: > or = 4 METS  (+) , hypercholesterolemia,     (-) murmur  Rhythm: regular  Rate: normal,    no murmur      Neuro/Psych - negative ROS     Endo/Other - negative ROS      GI/Hepatic/Renal    (+) GERD,   chronic renal disease (nephrolithiasis),      Other findings: Labs 3/20/19:  WBC 5.4, Hgb 16.1, Plt 163  Na 140, K 4.0, BUN 16, Cr 1.08      Dental - normal exam                        Anesthesia Plan  Planned anesthetic: general LMA  GA LMA.  Decadron and zofran for PONV ppx.  ASA 2   Induction: intravenous   Anesthetic plan and risks discussed with: patient and spouse  Anesthesia plan special considerations: antiemetics,   Post-op plan: routine recovery

## 2021-05-28 NOTE — ANESTHESIA POSTPROCEDURE EVALUATION
Patient: Segundo Wilson  CYSTOSCOPY URTETHRAL DILATION LEFT URETEROSCOPY STENT INSERTION  Anesthesia type: general    Patient location: Phase II Recovery  Last vitals:   Vitals Value Taken Time   /82 4/19/2019 11:13 AM   Temp 36.3  C (97.4  F) 4/19/2019 10:13 AM   Pulse 72 4/19/2019 11:13 AM   Resp 16 4/19/2019 11:13 AM   SpO2 99 % 4/19/2019 11:13 AM     Post vital signs: stable  Level of consciousness: awake and responds to simple questions  Post-anesthesia pain: pain controlled  Post-anesthesia nausea and vomiting: no  Pulmonary: unassisted, return to baseline  Cardiovascular: stable and blood pressure at baseline  Hydration: adequate  Anesthetic events: no    QCDR Measures:  ASA# 11 - Mandie-op Cardiac Arrest: ASA11B - Patient did NOT experience unanticipated cardiac arrest  ASA# 12 - Mandie-op Mortality Rate: ASA12B - Patient did NOT die  ASA# 13 - PACU Re-Intubation Rate: ASA13B - Patient did NOT require a new airway mgmt  ASA# 10 - Composite Anes Safety: ASA10A - No serious adverse event    Additional Notes:

## 2021-05-28 NOTE — TELEPHONE ENCOUNTER
Patient's urine culture returned no growth from early morning of 5/4/2019.  He has had no more fever he feels fine tolerating the medication.  He will continue the Levaquin and will see him Monday morning for stent removal as planned.  He most likely had sufficient antibiotic on board to cause no growth of culture but UA showed moderate bacteria and his C-reactive protein was 5.1.  In any event he is doing well either because of her in spite of the Levaquin.

## 2021-05-28 NOTE — ANESTHESIA CARE TRANSFER NOTE
Last vitals:   Vitals:    04/30/19 0934   BP: 118/65   Pulse: 62   Resp: 14   Temp: 37  C (98.6  F)   SpO2: 98%     Patient's level of consciousness is drowsy  Spontaneous respirations: yes  Maintains airway independently: yes  Dentition unchanged: yes  Oropharynx: oropharynx clear of all foreign objects    QCDR Measures:  ASA# 20 - Surgical Safety Checklist: WHO surgical safety checklist completed prior to induction    PQRS# 430 - Adult PONV Prevention: 4558F - Pt received => 2 anti-emetic agents (different classes) preop & intraop  ASA# 8 - Peds PONV Prevention: NA - Not pediatric patient, not GA or 2 or more risk factors NOT present  PQRS# 424 - Mandie-op Temp Management: 4559F - At least one body temp DOCUMENTED => 35.5C or 95.9F within required timeframe  PQRS# 426 - PACU Transfer Protocol: - Transfer of care checklist used  ASA# 14 - Acute Post-op Pain: ASA14B - Patient did NOT experience pain >= 7 out of 10

## 2021-05-28 NOTE — PROGRESS NOTES
Assessment/Plan:        Diagnoses and all orders for this visit:    Calculus of ureter  -     Urinalysis Macroscopic  -     Culture, Urine- Future; Future; Expected date: 06/05/2019  -     Culture, Urine- Future  -     lidocaine HCl 2 % topical jelly 10 mL (UROJET)    Other orders  -     lidocaine HCl (UROJET) 2 % topical jelly      Stone Management Plan  [facility] Stone Management 4/4/2019 4/18/2019 5/6/2019   Urinary Tract Infection No suspicion of infection No suspicion of infection No suspicion of infection   Renal Colic Well controlled symptoms Asymptomatic at this time Asymptomatic at this time   Renal Failure No suspicion of renal failure No suspicion of renal failure No suspicion of renal failure   Current CT date 4/4/2019 4/18/2019 -   Right sided stones? No Yes -   R Number of ureteral stones No ureteral stones No ureteral stones -   R GSD of ureteral stones - - -   R Location of ureteral stone - - -   R Number of kidney stones  - 1 -   R GSD of kidney stones - < 2 -   R Hydronephrosis None None -   R Stone Event No current event No current event No current event   Diagnosis date - - -   Initial location of primary symptomatic stone - - -   Initial GSD of primary symptomatic stone - - -   R MET status - - -   R Current Plan - Observe -   MET - - -   Observe rationale - Limited stone burden with good prognosis for spontaneous passage -   Left sided stones? Yes Yes -   L Number of ureteral stones 2 3 -   L GSD of ureteral stones 5 5 -   L Location of ureteral stone Proximal Proximal -   L Number of kidney stones  1 No renal stones -   L GSD of kidney stones < 2 N/A -   L Hydronephrosis Mild Mild -   L Stone Event Established event Established event -   Diagnosis date - - -   Initial location of primary symptomatic stone - - -   Initial GSD of primary symptomatic stone - - -   Post-op status - - Stent Removal   L MET Status No progression No progression -   L Current Plan MET Clear -   MET 2 week F/U - -   Clear  rationale - Poor prognosis -   Observe rationale - - -             Subjective:      HPI  Mr. Segundo Wilson is a 33 y.o.  male returning to the [facility] [place] for stent removal postop left laser lithotripsy stent placement 4/30/2019 with Dr. Love..  Stone analysis is pending.  Patient was not seen in the ER on 5 /3/2019 with temp of 102 C-reactive protein 5.1.  UA showed moderate bacteria with  WBCs.  Culture was done patient given Cipro with culture ultimately showing no growth.  He has been afebrile and without voiding symptoms on Levaquin in the interval.    UA today specific gravity 1.005 pH 6 large blood small leukocyte negative nitrate.    Flexible cystourethroscopy with 1% Xylocaine accomplished with stent removed without difficulty.    Impression postop laser lithotripsy left history as above stent now out    Plan finish Levaquin follow-up 4 weeks with CT and further disposition          ROS   Review of systems is negative except for HPI.    Past Medical History:   Diagnosis Date     GERD (gastroesophageal reflux disease)      Kidney stones        Past Surgical History:   Procedure Laterality Date     CYSTOSCOPY W/ URETERAL STENT PLACEMENT Left     urethral dilation, ureteroscopy     MN CYSTO/URETERO W/LITHOTRIPSY &INDWELL STENT INSRT Left 4/30/2019    Procedure: CYSTOSCOPY, LEFT URETEROSCOPY LASER LITHOTRIPSY STENT REMOVAL STENT INSERTION;  Surgeon: Frederick Love MD;  Location: [facility];  Service: Urology     MN ESOPHAGOGASTRIC FUNDOPLASTY      Description: Esophagogastric Fundoplasty Nissen Fundoplication;  Recorded: 03/18/2009;       Current Outpatient Medications   Medication Sig Dispense Refill     acetaminophen (TYLENOL) 500 MG tablet Take 1,000 mg by mouth every 6 (six) hours as needed for pain.       dimenhyDRINATE (DRAMAMINE) 50 MG tablet Take 50 mg by mouth at bedtime as needed.       ibuprofen (ADVIL,MOTRIN) 400 MG tablet Take 400 mg by mouth  every 6 (six) hours as needed for pain.       levoFLOXacin (LEVAQUIN) 500 MG tablet Take 1 tablet (500 mg total) by mouth daily for 7 days. 7 tablet 0     oxyCODONE (ROXICODONE) 5 MG immediate release tablet 1-2 tablets every four hours as required for severe pain 20 tablet 0     tamsulosin (FLOMAX) 0.4 mg cap 1 tablet (0.4 mg) daily with food 14 capsule 2     ranitidine (ZANTAC) 150 MG tablet Take 1 tablet (150 mg total) by mouth 2 (two) times a day. (Patient taking differently: Take 150 mg by mouth daily as needed.       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     Employer: [employer]   Social Needs     Financial resource strain: Not on file     Food insecurity:     Worry: Not on file     Inability: Not on file     Transportation needs:     Medical: Not on file     Non-medical: Not on file   Tobacco Use     Smoking status: Never Smoker     Smokeless tobacco: Never Used   Substance and Sexual Activity     Alcohol use: Yes     Comment: occ social drink     Drug use: No     Sexual activity: Yes     Partners: Female     Birth control/protection: None   Lifestyle     Physical activity:     Days per week: Not on file     Minutes per session: Not on file     Stress: Not on file   Relationships     Social connections:     Talks on phone: Not on file     Gets together: Not on file     Attends Taoist service: Not on file     Active member of club or organization: Not on file     Attends meetings of clubs or organizations: Not on file      Relationship status: Not on file     Intimate partner violence:     Fear of current or ex partner: Not on file     Emotionally abused: Not on file     Physically abused: Not on file     Forced sexual activity: Not on file   Other Topics Concern     Not on file   Social History Narrative     Not on file       Family History   Problem Relation Age of Onset     Diabetes Father      Urolithiasis Father         single stone     Clotting disorder Neg Hx      Gout Neg Hx      Cancer Neg Hx      Heart disease Neg Hx      Objective:      Physical Exam  Vitals:    05/06/19 0859   BP: (!) 137/92   Pulse: 94   Temp: 98.2  F (36.8  C)     General - well developed, well nourished, appropriate for age. Appears no distress at this time  Abdomen - mildly obese soft, non-tender, no hepatosplenomegaly, no masses.   - no flank tenderness, no suprapubic tenderness, kidney and bladder non-palpable  MSK - normal spinal curvature. no spinal tenderness. normal gait. muscular strength intact.  Psych - oriented to time, place, and person, normal mood and affect.      Labs   Urinalysis POC (Office):  Blood UA   Date Value Ref Range Status   09/17/2015 Negative Negative Final     Nitrite, UA   Date Value Ref Range Status   05/06/2019 Negative Negative Final   05/03/2019 Negative Negative Final   04/18/2019 Negative Negative Final     Leukocytes, UA    Date Value Ref Range Status   09/17/2015 Negative Negative Final     pH UA   Date Value Ref Range Status   09/17/2015 5.5 4.5 - 8.0 Final       Lab Urinalysis:  Blood, UA   Date Value Ref Range Status   05/06/2019 Large (!) Negative Final   05/03/2019 Large (!) Negative Final   04/18/2019 Negative Negative Final     Nitrite, UA   Date Value Ref Range Status   05/06/2019 Negative Negative Final   05/03/2019 Negative Negative Final   04/18/2019 Negative Negative Final     Leukocytes, UA   Date Value Ref Range Status   05/06/2019 Small (!) Negative Final   05/03/2019 Large (!) Negative Final    04/18/2019 Negative Negative Final     pH, UA   Date Value Ref Range Status   05/06/2019 6.0 5.0 - 8.0 Final   05/03/2019 6.0 4.5 - 8.0 Final   04/18/2019 5.5 5.0 - 8.0 Final

## 2021-05-28 NOTE — ANESTHESIA POSTPROCEDURE EVALUATION
Patient: Segundo Wilson  #2  CYSTOSCOPY, LEFT URETEROSCOPY LASER LITHOTRIPSY STENT REMOVAL STENT INSERTION  Anesthesia type: general    Patient location: PACU  Last vitals:   Vitals Value Taken Time   /83 4/30/2019 10:00 AM   Temp 36.6  C (97.8  F) 4/30/2019 10:00 AM   Pulse 72 4/30/2019 10:06 AM   Resp 21 4/30/2019 10:06 AM   SpO2 98 % 4/30/2019 10:05 AM   Vitals shown include unvalidated device data.  Post vital signs: stable  Level of consciousness: awake and responds to simple questions  Post-anesthesia pain: pain controlled  Post-anesthesia nausea and vomiting: no  Pulmonary: unassisted, return to baseline  Cardiovascular: stable and blood pressure at baseline  Hydration: adequate  Anesthetic events: no    QCDR Measures:  ASA# 11 - Mandie-op Cardiac Arrest: ASA11B - Patient did NOT experience unanticipated cardiac arrest  ASA# 12 - Mandie-op Mortality Rate: ASA12B - Patient did NOT die  ASA# 13 - PACU Re-Intubation Rate: ASA13B - Patient did NOT require a new airway mgmt  ASA# 10 - Composite Anes Safety: ASA10A - No serious adverse event    Additional Notes:

## 2021-05-28 NOTE — PROGRESS NOTES
Patient presents to the office today for a cystoscopy left ureteral stent removal procedure.    Patient educated regarding stent removal procedure and possible symptoms after removal.  Patient voiced understanding of information.  Handout given to patient.  Consent form signed.    [facility] Timeout    Correct patient?: Yes  Correct site?:  Yes  Correct procedure?:  Yes  Correct laterality?:  Left  Consents verified?:  Yes  Relevant lab results available?:  Yes

## 2021-05-28 NOTE — PATIENT INSTRUCTIONS - HE
Patient Stated Goal: Know what to expect after surgery  Cystoscopy with Stent Removal    Cystoscopy is used to help diagnose urinary problems, or to remove a ureteral stent.    During a cystoscopy, your doctor examines the inside of your bladder with an instrument called a cystoscope. A cystoscope is a long, thin flexible tube with a camera at the end.    Your doctor will insert the scope into your urethra allowing him to visualize and evaluate the inside of the bladder for possible abnormalities. The urethra is the tube that carries urine to the outside of your body.    How is the stent removed?    Your stent will be removed in the [facility] with a small telescope and a grasping tool.  It usually takes less than 1 minute to remove the stent.    What should I expect after the stent is removed?     You should feel normal by the next day.    Some patients find:      An increase in back pain about an hour after the stent is removed as the kidney fills up with urine before it starts to empty.  It can be as uncomfortable as your initial stone episode.  Taking pain medications before stent removal may be helpful, but you would need someone else to drive you to and from your appointment.    Bladder symptoms usually disappear by the next morning.    Small amounts of blood in the urine may be seen occasionally for up to a week.    At Home:      It is important to drink plenty of fluids after your procedure    You may continue to use your pain medications as prescribed    What symptoms should I watch for?    Fever     Chills    Increasing back pain that is not relieved with pain medications    Large amounts of blood in the urine or large clots    Leakage of urine (incontinence)     Are not able to urinate for 8 hours    These symptoms may mean you have a blockage or infection. Call the [facility] 24 hours a day at [phone] immediately.

## 2021-05-29 NOTE — PATIENT INSTRUCTIONS - HE
Patient Stated Goal: Prevent further stones  Steps for collecting a 24 hour urine specimen    Please follow the directions carefully. All urine voided for a 24-hour period needs to be collected into the jug.  DO NOT change any of your  normal  daily habits when doing this test. Continue to follow your regular diet, intake of fluids, and usual activity level. Pick the most convenient day with your schedule, perhaps on a weekend or a day off.    Start your Diet Log the day before collection and continue on the day of urine collection.  You MUST bring Diet Log with you on follow up visit to discuss results.    One 24hr Urine Collection     Two 24hr Urine Collections  (do not collect on consecutive days)    PLEASE COMPLETE THE 2nd JUG WITHIN 1-2 WEEKS FROM THE 1st JUG    STEP 1  Empty your bladder completely into the toilet. This will be your start time. Write your full legal name, start date and time on the jug label.  Collection start and stop times need to match exactly!  For example:  6 am to 6 am.    STEP 2  The next time you urinate, empty your bladder directly into the jug or collection hat and pour urine into the jug.  Screw the lid back onto the jug.  Do not spill!    STEP 3  Place the jug in the refrigerator or a cooler with ice during the collection period.  Failure to keep it cool could cause inaccurate test results. DO NOT Freeze.    STEP 4  Continue collecting all urine into the jug for the rest of the day, for the full 24 hours.  DO NOT stop early or go over 24 hours!    STEP 5  Exactly 24 hours from start of collection, write your full legal name, stop date and time on the jug label.   Collection start and stop times need to match exactly!  For example:  6 am to 6 am.  Failure to label correctly will result in recollection of urine specimen.    STEP 6  Return each jug within 24 hours after final urination.     STEP 7  Drop off jug locations:   [facility]: Mon-Fri 7am-7pm - Closed on  weekends  [facility]: Mon-Fri 7am-5pm - Closed on Sunday  [facility]: Mon-Fri 7am-6:30pm - Closed on weekends    STEP 8  Please call [facility] after return of your final jug to schedule your follow-up visit. [phone]

## 2021-05-29 NOTE — PROGRESS NOTES
Assessment/Plan:        Diagnoses and all orders for this visit:    Calculus of ureter  -     Urinalysis Macroscopic    Calculus of kidney  -     Stone Formation, 24 Hour Urine x2; Standing  -     Patient Stated Goal: Prevent further stones  -     24 Hour Urine Collection Steps Education      Stone Management Plan  [facility] Stone Management 4/18/2019 5/6/2019 6/10/2019   Urinary Tract Infection No suspicion of infection No suspicion of infection No suspicion of infection   Renal Colic Asymptomatic at this time Asymptomatic at this time Asymptomatic at this time   Renal Failure No suspicion of renal failure No suspicion of renal failure No suspicion of renal failure   Current CT date 4/18/2019 - 6/10/2019   Right sided stones? Yes - Yes   R Number of ureteral stones No ureteral stones - No ureteral stones   R GSD of ureteral stones - - -   R Location of ureteral stone - - -   R Number of kidney stones  1 - Renal stones unchanged from last exam   R GSD of kidney stones < 2 - < 2   R Hydronephrosis None - None   R Stone Event No current event No current event No current event   Diagnosis date - - -   Initial location of primary symptomatic stone - - -   Initial GSD of primary symptomatic stone - - -   R MET status - - -   R Current Plan Observe - Observe   MET - - -   Observe rationale Limited stone burden with good prognosis for spontaneous passage - Limited stone burden with good prognosis for spontaneous passage   Left sided stones? Yes - No   L Number of ureteral stones 3 - -   L GSD of ureteral stones 5 - -   L Location of ureteral stone Proximal - -   L Number of kidney stones  No renal stones - -   L GSD of kidney stones N/A - -   L Hydronephrosis Mild - None   L Stone Event Established event - Resolved event   Diagnosis date - - -   Initial location of primary symptomatic stone - - -   Initial GSD of primary symptomatic stone - - -   Resolved date - - 6/10/2019   Post-op status - Stent Removal No residual stone   L  MET Status No progression - -   L Current Plan Clear - -   MET - - -   Clear rationale Poor prognosis - -   Observe rationale - - -             Subjective:      HPI  Mr. Segundo Wilson is a 33 y.o.  male returning to the [facility] [place] for late postoperative follow-up.     He returns status post staged Left ureteroscopic laser lithotripsy for ureteral stones, initially encountering urethral stricture and ureteral stenosis. He has had no unanticipated events.     He is asymptomatic at present. He denies symptoms of fever, chills, flank pain, nausea, vomiting, urinary frequency and dysuria.    New CT scan was personally reviewed and demonstrates complete clearance of targeted stone with resolution of previous hydronephrosis. No change to tiny right renal stone.    Stone composition was 100% calcium oxalate.     PLAN    34 yo M with hx of rapidly recurrent calcium oxalate stone disease.  Interval left ureteroscopy with complete clearance of multiple ureteral stones. Tiny right renal stone.    He is at risk for ongoing active stone disease and will initiate stone risk evaluation. Two 24 hour urine collections and dietary journal will be obtained at earliest covenience.    Patient also seen and examined by Lilliana Bowling PA-C       ROS   Review of systems is negative except for HPI.    Past Medical History:   Diagnosis Date     GERD (gastroesophageal reflux disease)      Kidney stones        Past Surgical History:   Procedure Laterality Date     CYSTOSCOPY W/ URETERAL STENT PLACEMENT Left     urethral dilation, ureteroscopy     [place] CYSTO/URETERO W/LITHOTRIPSY &INDWELL STENT INSRT Left 4/30/2019    Procedure: CYSTOSCOPY, LEFT URETEROSCOPY LASER LITHOTRIPSY STENT REMOVAL STENT INSERTION;  Surgeon: Frederick Love MD;  Location: [facility];  Service: Urology     [place] ESOPHAGOGASTRIC FUNDOPLASTY      Description: Esophagogastric Fundoplasty Nissen Fundoplication;  Recorded: 03/18/2009;        No current outpatient medications on file.     No current facility-administered medications for this visit.        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     Employer: [employer]   Social Needs     Financial resource strain: Not on file     Food insecurity:     Worry: Not on file     Inability: Not on file     Transportation needs:     Medical: Not on file     Non-medical: Not on file   Tobacco Use     Smoking status: Never Smoker     Smokeless tobacco: Never Used   Substance and Sexual Activity     Alcohol use: Yes     Comment: occ social drink     Drug use: No     Sexual activity: Yes     Partners: Female     Birth control/protection: None   Lifestyle     Physical activity:     Days per week: Not on file     Minutes per session: Not on file     Stress: Not on file   Relationships     Social connections:     Talks on phone: Not on file     Gets together: Not on file     Attends Mandaeism service: Not on file     Active member of club or organization: Not on file     Attends meetings of clubs or organizations: Not on file     Relationship status: Not on file     Intimate partner violence:     Fear of current or ex partner: Not on file     Emotionally abused: Not on file     Physically abused: Not on file     Forced sexual activity: Not on file   Other Topics Concern     Not on file   Social History Narrative     Not on file       Family History   Problem Relation Age of Onset     Diabetes Father      Urolithiasis Father         single stone     Clotting disorder Neg Hx      Gout Neg Hx      Cancer Neg Hx      Heart disease Neg Hx      Objective:      Physical Exam  Vitals:    06/10/19 0910   BP: (!) 144/93   Pulse: 72   Temp: 97.9  F (36.6  C)     General  - well developed, well nourished, appropriate for age. Appears no distress at this time  Abdomen - slender soft, non-tender, no hepatosplenomegaly, no masses.   - no flank tenderness, no suprapubic tenderness, kidney and bladder non-palpable  MSK - normal spinal curvature. no spinal tenderness. normal gait. muscular strength intact.  Psych - oriented to time, place, and person, normal mood and affect.      Labs   Urinalysis POC (Office):  Blood UA   Date Value Ref Range Status   09/17/2015 Negative Negative Final     Nitrite, UA   Date Value Ref Range Status   05/06/2019 Negative Negative Final   05/03/2019 Negative Negative Final   04/18/2019 Negative Negative Final     Leukocytes, UA    Date Value Ref Range Status   09/17/2015 Negative Negative Final     pH UA   Date Value Ref Range Status   09/17/2015 5.5 4.5 - 8.0 Final       Lab Urinalysis:  Blood, UA   Date Value Ref Range Status   05/06/2019 Large (!) Negative Final   05/03/2019 Large (!) Negative Final   04/18/2019 Negative Negative Final     Nitrite, UA   Date Value Ref Range Status   05/06/2019 Negative Negative Final   05/03/2019 Negative Negative Final   04/18/2019 Negative Negative Final     Leukocytes, UA   Date Value Ref Range Status   05/06/2019 Small (!) Negative Final   05/03/2019 Large (!) Negative Final   04/18/2019 Negative Negative Final     pH, UA   Date Value Ref Range Status   05/06/2019 6.0 5.0 - 8.0 Final   05/03/2019 6.0 4.5 - 8.0 Final   04/18/2019 5.5 5.0 - 8.0 Final

## 2021-05-31 VITALS — WEIGHT: 204 LBS | HEIGHT: 70 IN | BODY MASS INDEX: 29.2 KG/M2

## 2021-06-01 VITALS — WEIGHT: 209 LBS | BODY MASS INDEX: 29.99 KG/M2

## 2021-06-01 VITALS — BODY MASS INDEX: 29.41 KG/M2 | WEIGHT: 205 LBS

## 2021-06-02 VITALS — BODY MASS INDEX: 29.78 KG/M2 | HEIGHT: 70 IN | WEIGHT: 208 LBS

## 2021-06-03 VITALS — HEIGHT: 70 IN | BODY MASS INDEX: 27.57 KG/M2 | WEIGHT: 192.56 LBS

## 2021-06-03 VITALS — WEIGHT: 194 LBS | BODY MASS INDEX: 27.77 KG/M2 | HEIGHT: 70 IN

## 2021-06-04 VITALS
HEART RATE: 77 BPM | SYSTOLIC BLOOD PRESSURE: 160 MMHG | BODY MASS INDEX: 31.18 KG/M2 | HEIGHT: 70 IN | OXYGEN SATURATION: 97 % | WEIGHT: 217.8 LBS | DIASTOLIC BLOOD PRESSURE: 102 MMHG

## 2021-06-08 NOTE — PATIENT INSTRUCTIONS - HE
1. You will receive calls to schedule with general surgery and ENT.  2. Sertraline 25 mg daily for 7 days, then 50 mg daily.  3. Decrease alcohol use. I would recommend eliminating use until mood is improved. Once improved, a limit of 7-14 drinks per week is recommended.  4. Wear compression stockings when sedentary for long periods.   5. Follow up regarding mood symptoms in about 8 weeks.

## 2021-06-08 NOTE — PROGRESS NOTES
Assessment/Plan:      Problem List Items Addressed This Visit     Depressed mood - Primary     Patient describes some mild long term symptoms but he has usually been able to manage with lifestyle. I suspect symptoms have worsened due to stress related to ongoing pandemic. We discussed that alcohol acts chemically as a depressant and is likely causing worsening of his symptoms. I am recommending cessation of alcohol use until his symptoms have improved and then a limit of 7-14 drinks per week. He feels he can do this without any outside assistance. We discussed treatment options. He is not interested in counseling. Start sertraline 25 mg daily for 7 days, then 50 mg daily. He was also given information on lifestyle changes. Follow up in 8 weeks.         Relevant Medications    sertraline (ZOLOFT) 50 MG tablet      Other Visit Diagnoses     Sebaceous cyst  - reassured patient about benign nature. Given growth and location he would like it removed. Referral placed to ENT.      Relevant Orders    Ambulatory referral to ENT    Umbilical hernia without obstruction and without gangrene        Relevant Orders    Ambulatory referral to General Surgery    Varicose veins of left lower extremity, unspecified whether complicated    - I suspect swelling is related to his varicose veins. He has compression stockings at home and will use those as needed.          Patient Instructions   1. You will receive calls to schedule with general surgery and ENT.  2. Sertraline 25 mg daily for 7 days, then 50 mg daily.  3. Decrease alcohol use. I would recommend eliminating use until mood is improved. Once improved, a limit of 7-14 drinks per week is recommended.  4. Wear compression stockings when sedentary for long periods.   5. Follow up regarding mood symptoms in about 8 weeks.        No follow-ups on file.    Subjective:   Segundo Wilson is a 34 y.o. male who presents today with a few concerns. First, he noticed a lump on his anterior  "neck a couple of months ago. He suspected an ingrown hair. It has been getting bigger over time.    He has also noticed a lump in the umbilical area about a month ago. He has had some weight gain during that time. It is a little sore to touch. No nausea or emesis.     He is also concerned about some swelling in the posterior left ankle that he noticed today. He reports that it isn't hurting him at all.  He did have varicose vein surgery about two years ago.    He is also having a lot of increased stress. He is unemployed currently and kids are now at home. He feels he is more short tempered than usual. He has never been on any medication for his mood. He drinks about 5-6 drinks per night. He reports that his mom has some issue with her mood but he isn't sure about the details.    Patient Active Problem List   Diagnosis     Dyslipidemia     Abdominal pain     Calculus of ureter     Hydronephrosis with urinary obstruction due to ureteral calculus     Calculus of kidney     Other urethral bulbous stricture, male     Depressed mood      Past Medical History:   Diagnosis Date     GERD (gastroesophageal reflux disease)      Kidney stones      Past Surgical History:   Procedure Laterality Date     CYSTOSCOPY W/ URETERAL STENT PLACEMENT Left     urethral dilation, ureteroscopy     OR CYSTO/URETERO W/LITHOTRIPSY &INDWELL STENT INSRT Left 4/30/2019    Procedure: CYSTOSCOPY, LEFT URETEROSCOPY LASER LITHOTRIPSY STENT REMOVAL STENT INSERTION;  Surgeon: Frederick Love MD;  Location: [facility];  Service: Urology     OR ESOPHAGOGASTRIC FUNDOPLASTY      Description: Esophagogastric Fundoplasty Nissen Fundoplication;  Recorded: 03/18/2009;       Review of System: Relevant items noted in HPI. ROS otherwise negative.       Objective:     Vitals:    05/21/20 1444   BP: (!) 160/102   Pulse: 77   SpO2: 97%   Weight: 217 lb 12.8 oz (98.8 kg)   Height: 5' 10\" (1.778 m)       Physical Exam  Constitutional:       General: He is " not in acute distress.     Appearance: He is not ill-appearing.   Neck:      Comments: Nodule anterior neck consistent with a sebaceous cyst. No evidence of infection. It measures about 1/2 cm in diameter.  Abdominal:      General: Abdomen is flat. Bowel sounds are normal. There is no distension.      Palpations: There is no hepatomegaly or splenomegaly.      Hernia: A hernia is present. Hernia is present in the umbilical area (small, easily reducible, mild tenderness).   Lymphadenopathy:      Cervical: No cervical adenopathy.   Skin:     Comments: Varicose veins present bilateral lower extremities. Mild swelling consistent with enlarged veins medial left ankle.   Neurological:      General: No focal deficit present.      Mental Status: He is oriented to person, place, and time.      Cranial Nerves: No cranial nerve deficit.      Gait: Gait normal.   Psychiatric:         Mood and Affect: Mood normal.         Behavior: Behavior normal.         Thought Content: Thought content normal.         Judgment: Judgment normal.     PHQ-9 Total Score: 6 (5/21/2020  3:00 PM)   OREN-7 Total: 11 (5/21/2020  3:00 PM)

## 2021-06-09 NOTE — PROGRESS NOTES
HISTORY OF PRESENT ILLNESS  Asked to see by Dr. Duran for evaluation of sebaceous cyst. Patient reports that he has a lump on the right side of his neck that started several months ago. It has gotten bigger over the last several months. No pain. No redness. It stays white. No fever, sweats or chills. No history of skin or skin structure infections.     REVIEW OF SYSTEMS  Review of Systems: a 10-system review was performed. Pertinent positives are noted in the HPI and on a separate scanned document in the chart.    PMH, PSH, FH and SH has documented in the EHR.      EXAM    CONSTITUTIONAL  General Appearance:  Normal, well developed, well nourished, no obvious distress  Ability to Communicate:  communicates appropriately.    HEAD AND FACE  Appearance and Symmetry:  Normal, no scalp or facial scarring or suspicious lesions.    EARS  Clinical speech reception threshold:  Normal, able to hear normal speech.  Auricle:  Normal, Auricles without scars, lesions, masses.  External auditory canal:  Normal, External auditory canal normal.  Tympanic membrane:  Normal, Tympanic membranes normal without swelling or erythema.      NOSE (speculum or scope)  Architecture:  Normal, Grossly normal external nasal architecture with no masses or lesions.  Mucosa:  Normal mucosa, No polyps or masses.  Septum:  Normal, Septum non-obstructing.  Turbinates:  Normal, No turbinate abnormalities    ORAL CAVITY AND OROPHARYNX  Lips:  Normal.  Dental and gingiva:  Normal, No obvious dental or gingival disease.  Mucosa:  Normal, Moist mucous membranes.  Tongue:  Normal, Tongue mobile with no mucosal abnormalities  Hard and soft palate:  Normal, Hard and soft palate without cleft or mucosal lesions.  Oral pharynx:  Normal, Posterior pharynx without lesions or remarkable asymmetry.  Saliva:  Normal, Clear saliva.  Masses:  Normal, No palpable masses or pathologically enlarged lymph nodes.    NECK  Masses/lymph nodes: 1cm sebaceous cyst right  neck.  Salivary glands:  Normal, Parotid and submandibular glands.  Trachea and larynx position:  Normal, Trachea and larynx midline.  Thyroid:  Normal, No thyroid abnormality.  Tenderness:  Normal, No cervical tenderness.  Suppleness:  Normal, Neck supple    NEUROLOGICAL  Speech pattern:  Normal, Proasaic    RESPIRATORY  Symmetry and Respiratory effort:  Normal, Symmetric chest movement and expansion with no increased intercostal retractions or use of accessory muscles.     IMPRESSION  Sebaceous cyst right neck.     RECOMMENDATION  I discussed excision with simple closure. He wants to proceed.    The area was infiltrated with lidocaine plus epinephrine solution. After allowing adequate time for anesthesia a 15 blade was used to make skin incision. The lesion was removed using sharp scissors. A single 4-0 monocryl suture was placed followed by dermabond on the skin. Patient tolerated the procedure without difficulty.    Caleb Coto MD

## 2021-06-09 NOTE — TELEPHONE ENCOUNTER
Spoke to patient. He states the swelling on his neck has increased and a brown drainage coming from the incision. No fever. He has taken 3 doses of Cefuroxime so far and feels it is getting worse. Message sent to Dr. Coto. Per Dr. Coto, patient should be seen in clinic tomorrow. Appointment scheduled and patient notified.    Smitha Tellez RN  [facility]  [phone]

## 2021-06-09 NOTE — TELEPHONE ENCOUNTER
Pt had cyst on neck removed by Dr. Coto and was put on antibiotics yesterday, but today the area where the cyst was removed is very swollen and and puss is coming out. He's not sure if it's infected and if antibiotics will work. Please call pt at [phone].

## 2021-06-09 NOTE — PROGRESS NOTES
HISTORY OF PRESENT ILLNESS  Patient comes in for recheck after excision of a sebaceous cyst right neck. He developed redness and drainage 2 days ago. He was started on cefuroxime 2 days ago.    REVIEW OF SYSTEMS  Review of Systems: a 10-system review was performed. Pertinent positives are noted in the HPI and on a separate scanned document in the chart.    PMH, PSH, FH and SH has documented in the EHR.      EXAM    CONSTITUTIONAL  General Appearance:  Normal, well developed, well nourished, no obvious distress  Ability to Communicate:  communicates appropriately.    HEAD AND FACE  Appearance and Symmetry:  Normal, no scalp or facial scarring or suspicious lesions.    NECK  Incision site is mildly erythematous with mild crusting.   Masses/lymph nodes:  Normal, No worrisome neck masses or lymph nodes.  Salivary glands:  Normal, Parotid and submandibular glands.  Trachea and larynx position:  Normal, Trachea and larynx midline.  Thyroid:  Normal, No thyroid abnormality.  Tenderness:  Normal, No cervical tenderness.  Suppleness:  Normal, Neck supple    NEUROLOGICAL  Speech pattern:  Normal, Proasaic    RESPIRATORY  Symmetry and Respiratory effort:  Normal, Symmetric chest movement and expansion with no increased intercostal retractions or use of accessory muscles.     IMPRESSION  Overall improving. No evidence of active infection todyay.    RECOMMENDATION  Follow up as needed.    Caleb Coto MD

## 2021-06-09 NOTE — TELEPHONE ENCOUNTER
Received voicemail from Segundo. He would like to do surgery on Thursday 7/2. Will send details to his MyChart.    [facility], General Surgery  Surgery Scheduler  [phone] (Direct Line)  [phone] (Main Line)

## 2021-06-09 NOTE — TELEPHONE ENCOUNTER
Spoke with Segundo today to schedule surgery. Offered dates for this Friday 6/26 or Thursday 7/2. Segundo wants to talk to his wife first and will call back to schedule.    Tenisha CASTRO [facility], General Surgery  Surgery Scheduler  [phone] (Direct Line)  [phone] (Main Line)

## 2021-06-09 NOTE — TELEPHONE ENCOUNTER
Patient called stating his neck incision is red, swollen and draining brown drainage that started yesterday. No fever. He had a cyst on his neck excised by Dr. Coto on 6/19. Per Dr. Coto, patient should take Cefuroxime and if no improvement in 48 hours to give me a call back. He agreed.    Smitha Tellez RN  [facility]  [phone]

## 2021-06-09 NOTE — TELEPHONE ENCOUNTER
Received a call from Segundo Wilson. He was scheduled for surgery with Dr. Hu on 7/2/2020 and was hoping to get this done while he was still on furlough at work. His employer called him and he is scheduled to go back to work on Monday 6/29/2020 so he has to post-pone until he can have surgery. Segundo will call us when he is able to reschedule.      [facility], General Surgery  Surgery Scheduler  [phone] (Direct Line)  [phone] (Main Line)

## 2021-06-11 ENCOUNTER — AMBULATORY - HEALTHEAST (OUTPATIENT)
Dept: NURSING | Facility: CLINIC | Age: 36
End: 2021-06-11

## 2021-06-12 NOTE — PROGRESS NOTES
Assessment:     1. Insect bite  Lyme Antibody [place]    HM1(CBC and Differential)    HM1 (CBC with Diff)    Ehrlichia Antibody Panel    Bartonella Anitbody Panel    Babesia microti Antibody IgG       Plan:     1. Insect bite  No treatment will be rendered until the following tests have ruled out the possibility of the following diseases due to the fact that the rash is unknown factor unknown and in light of the fact that the symptoms are improving  - Lyme Antibody [place]  - HM1(CBC and Differential)  - HM1 (CBC with Diff)  - Ehrlichia Antibody Panel  - Bartonella Anitbody Panel  - Babesia microti Antibody IgG      Subjective:   This patient is being seen with a rash on his right thigh measuring 3.5 x 4.5 cm patient was camping approximately 1 month ago when he noticed a bump on his right thigh which became red gets it became slightly swollen no history of tick bite but possibly ability exists he [place] was accompanied by his dog as well as his family.  Patient did not notice any pain or discomfort but the area was a little swollen it has now become indurated and more firm but now more pink than it was read approximately a month ago wife is concerned patient has never had chickenpox rash is stable is not urticarial it is not involved in the axillary lymph nodes.  Based on the timing story in the context of the situation possibility of a tick bite exists we will therefore screen the patient for babesiosis and Bartonella and Ehrlichia and Lyme's disease as well as do a hemogram no treatment will be rendered until test results are and patient denies any gastrointestinal complaints nausea vomiting no joint pain stiffness no urgency frequency dysuria no hot or cold intolerance no lethargy muscle aches or allergy symptoms all medical questions that were asked were answered I personally reviewed family social history surgeries allergies problems list return as needed I will keep an eye for the test  "results    Review of Systems: A complete 14 point review of systems was obtained and is negative or as stated in the history of present illness.    Past Medical History:   Diagnosis Date     Kidney stones      Family History   Problem Relation Age of Onset     Urolithiasis Father      Diabetes Father      Clotting disorder Neg Hx      Gout Neg Hx      Cancer Neg Hx      Heart disease Neg Hx      Past Surgical History:   Procedure Laterality Date     [place] ESOPHAGOGASTRIC FUNDOPLASTY      Description: Esophagogastric Fundoplasty Nissen Fundoplication;  Recorded: 03/18/2009;     Social History   Substance Use Topics     Smoking status: Never Smoker     Smokeless tobacco: Not on file     Alcohol use Yes      Comment: occ social drink         Objective:   /62 (Patient Site: Left Arm, Patient Position: Sitting, Cuff Size: Adult Large)  Pulse 74  Ht 5' 10\" (1.778 m)  Wt 204 lb (92.5 kg)  SpO2 98%  BMI 29.27 kg/m2    General Appearance:  Alert, cooperative, no distress  Head:  Normocephalic, no obvious abnormality  Ears: TM anatomy normal  Eyes:  PERRL, EOM's intact, conjunctiva and corneas clear  Nose:  Nares symmetrical, septum midline, mucosa pink, no sinus tenderness  Throat:  Lips, tongue, and mucosa are moist, pink, and intact  Neck:  Supple, symmetrical, trachea midline, no adenopathy; thyroid: no enlargement, symmetric,no tenderness/mass/nodules; no carotid bruit, no JVD  Back:  Symmetrical, no curvature, ROM normal, no CVA tenderness  Chest/Breast:  No mass or tenderness  Lungs:  Clear to auscultation bilaterally, respirations unlabored   Heart:  Normal PMI, regular rate & rhythm, S1 and S2 normal, no murmurs, rubs, or gallops  Abdomen:  Soft, non-tender, bowel sounds active all four quadrants, no mass, or organomegaly  Musculoskeletal:  Tone and strength strong and symmetrical, all extremities  Lymphatic:  No adenopathy  Skin/Hair/Nails:  Skin warm, dry, and intact patient has a slightly pink indurated " area on his right lateral thigh with no primary vector bite measuring 3.5 x 4.5 cm which appears to be fading possible insect vector bite 4 weeks old  Neurologic:  Alert and oriented x3, no cranial nerve deficits, normal strength and tone, gait steady  Extremities:  No edema.  Adan's sign negative.    Genitourinary: deferred  Pulses:  Equal bilaterally        This note has been dictated using voice recognition software. Any grammatical or context distortions are unintentional and inherent to the the software.

## 2021-06-16 PROBLEM — R45.89 DEPRESSED MOOD: Status: ACTIVE | Noted: 2020-05-21

## 2021-06-16 PROBLEM — K42.9 UMBILICAL HERNIA WITHOUT OBSTRUCTION AND WITHOUT GANGRENE: Status: ACTIVE | Noted: 2020-06-23

## 2021-06-16 PROBLEM — N20.0 CALCULUS OF KIDNEY: Status: ACTIVE | Noted: 2018-07-17

## 2021-06-16 PROBLEM — N20.1 CALCULUS OF URETER: Status: ACTIVE | Noted: 2018-07-17

## 2021-06-16 PROBLEM — N13.2 HYDRONEPHROSIS WITH URINARY OBSTRUCTION DUE TO URETERAL CALCULUS: Status: ACTIVE | Noted: 2018-07-17

## 2021-06-16 NOTE — PROGRESS NOTES
"  Assessment/Plan:     Patient presents 7 days status post resolution of symptoms consistent with a bronchitis or upper respiratory tract infection per patient report.  He persists in having a cough and some mild rhinorrhea but no additional symptoms.  Discussed the option of cough syrup with codeine, but patient usually experiences symptoms during the day, and feels as though he can manage on his own.  We discussed symptomatic treatment and that this is likely the normal course of a post viral cough syndrome.  Patient will return for worsening symptoms.    Problem List Items Addressed This Visit     None      Visit Diagnoses     Post-viral cough syndrome    -  Primary          Return to clinic PRN.    Total time spent with patient was 10 minutes with greater than 50% spent in face-to-face counseling regarding the above plan.    Subjective:      Segundo Wilson is a 32 y.o. male who presents to clinic for persistent cough.    Patient was very ill last week.  In the past 7 days though his expands no symptoms aside from occasional rhinorrhea and he \"cannot stop coughing\".  He continues to have a productive cough.  He is currently dry cough drops and DayQuil.  His partner urged him to come in to be seen, but he feels overall well.      Past Medical History, Family History, and Social History reviewed.     No current outpatient prescriptions on file prior to visit.     No current facility-administered medications on file prior to visit.        Review of systems is as stated in HPI.  The remainder of the 10 system review is otherwise negative.    Objective:     /82 (Patient Site: Right Arm, Patient Position: Sitting, Cuff Size: Adult Large)  Pulse 71  Temp 98  F (36.7  C)  Resp 14  Wt 205 lb (93 kg)  SpO2 97%  BMI 29.41 kg/m2 Body mass index is 29.41 kg/(m^2).    Gen: Alert, NAD, appears stated age, normal hygiene   Eyes: conjunctivae without injection, sclera clear, EOMI  ENT/mouth: nares clear, septum " midline, absent rhinorrhea, absent pharyngeal injection, neck is supple, no thyroid enlargement  CV: RRR, no murmur appreciated, pedal edema absent bilaterally  Resp: CTAB, no wheezes, rales or ronchi  ABD: non-tender to palpation, nondistended  MSK: grossly full range of motion in all joints, no obvious deformity  Neuro: CN II-XII grossly intact, no deficits in coordination  Psych: no apparent hallucinations or delusions, no pressured speech; alert, oriented x3  SKIN: dry and without lesions  Heme/lymph: no pallor, no active bleeding/bruising, no adenopathy appreciated    This note has been dictated using voice recognition software. Any grammatical or context distortions are unintentional and inherent to the the software.     Mary Hirsch MD  [facility]

## 2021-06-17 NOTE — PATIENT INSTRUCTIONS - HE
Patient Instructions by Mary Hirsch MD at 2/26/2019  9:10 AM     Author: Mary Hirsch MD Service: -- Author Type: Physician    Filed: 2/26/2019  9:15 AM Encounter Date: 2/26/2019 Status: Addendum    : Mary Hirsch MD (Physician)    Related Notes: Original Note by Mary Hirsch MD (Physician) filed at 2/26/2019  9:12 AM         Patient Education     Tips to Control Acid Reflux    To control acid reflux, youll need to make some basic diet and lifestyle changes. The simple steps outlined below may be all youll need to ease discomfort.  Watch what you eat    Avoid fatty foods and spicy foods.    Eat fewer acidic foods, such as citrus and tomato-based foods. These can increase symptoms.    Limit drinking alcohol, caffeine, and fizzy beverages. All increase acid reflux.    Try limiting chocolate, peppermint, and spearmint. These can worsen acid reflux in some people.  Watch when you eat    Avoid lying down for 3 hours after eating.    Do not snack before going to bed.  Raise your head  Raising your head and upper body by 4 to 6 inches helps limit reflux when youre lying down. Put blocks under the head of your bed frame to raise it.  Other changes    Lose weight, if you need to    Dont exercise near bedtime    Avoid tight-fitting clothes    Limit aspirin and ibuprofen    Stop smoking   Date Last Reviewed: 7/1/2016 2000-2017 [employer]. [address] [place] [address], [place] [address]. All rights reserved. This information is not intended as a substitute for professional medical care. Always follow your healthcare professional's instructions.           Patient Education     Lifestyle Changes for Controlling GERD  When you have GERD, stomach acid feels as if its backing up toward your mouth. Whether or not you take medicine to control your GERD, your symptoms can often be improved with lifestyle changes. Talk to your healthcare provider about the following suggestions.  These suggestions may help you get relief from your symptoms.      Raise your head  Reflux is more likely to strike when youre lying down flat, because stomach fluid can flow backward more easily. Raising the head of your bed 4 to 6 inches can help. To do this:    Slide blocks or books under the legs at the head of your bed. Or, place a wedge under the mattress. Many foam stores can make a suitable wedge for you. The wedge should run from your waist to the top of your head.    Dont just prop your head on several pillows. This increases pressure on your stomach. It can make GERD worse.  Watch your eating habits  Certain foods may increase the acid in your stomach or relax the lower esophageal sphincter. This makes GERD more likely. Its best to avoid the following if they cause you symptoms:    Coffee, tea, and carbonated drinks (with and without caffeine)    Fatty, fried, or spicy food    Mint, chocolate, onions, and tomatoes    Peppermint    Any other foods that seem to irritate your stomach or cause you pain  Relieve the pressure  Tips include the following:    Eat smaller meals, even if you have to eat more often.    Dont lie down right after you eat. Wait a few hours for your stomach to empty.    Avoid tight belts and tight-fitting clothes.    Lose excess weight.  Tobacco and alcohol  Avoid smoking tobacco and drinking alcohol. They can make GERD symptoms worse.  Date Last Reviewed: 7/1/2016 2000-2017 [employer]. [address] [place] [id number]. All rights reserved. This information is not intended as a substitute for professional medical care. Always follow your healthcare professional's instructions.           Patient Education     Medicines for GERD  Gastroesophageal reflux disease (GERD) can be treated with medicine. This may be done with a medicine you can buy over the counter. Or it may be done with a medicine that your healthcare provider has to prescribe. In some cases, both  types may be used. Your provider will tell you what is best for your symptoms.  Antacids  Antacids work to weaken the acid in your stomach and can give you quick relief. You can buy many of them with no prescription. Antacids can be high in sodium. This may be a problem if you have high blood pressure. Some antacids also have aluminum. This should be avoided if you have long-term (chronic) kidney disease. So check with your provider first. Take antacids only when you need to, as advised by your provider.  Side effects: Constipation, diarrhea. If you take too much medicine, it can cause calcium to build up.   H-2 blockers  H-2 blockers cause the stomach to make less acid. They are often used both on demand as symptoms occur, and daily to keep symptoms away. Your provider may prescribe them if antacids dont work for you. You can buy some of them over the counter. These come in a lower dosage.  Side effects: Confusion in older adults  Proton-pump inhibitors  These also cause the stomach to make less acid. They reduce stomach acid more than H-2 blockers. They may be used for a short time, or longer to treat certain conditions. You can buy some of them over the counter. Or your provider may prescribe them. They help control GERD symptoms.  Side effects: Belly (abdominal) pain, diarrhea, upset stomach (nausea). Possible other side effects linked to long-term use and high doses.  Prokinetics  These medicines affect the movement of the digestive tract. They may be recommended if your stomach is emptying too slowly. But in most cases they are not recommended for treating GERD.   Side effects: Tiredness, depression, anxiety, problems with physical movement, belly cramps, constipation, diarrhea, a jittery feeling  Medicines to avoid  Dont take aspirin without your providers approval. And dont take a nonsteroidal anti-inflammatory drug (NSAID), such as ibuprofen. These reduce the protective lining of your stomach. This can lead  to more GERD symptoms. Check with your provider or pharmacist before taking a new medicine.   Date Last Reviewed: 7/1/2016 2000-2017 [employer]. [address] [place] [address], [place] [address]. All rights reserved. This information is not intended as a substitute for professional medical care. Always follow your healthcare professional's instructions.

## 2021-06-19 NOTE — PROGRESS NOTES
Assessment/Plan:        Diagnoses and all orders for this visit:    Calculus of ureter  -     oxyCODONE (ROXICODONE) 5 MG immediate release tablet; Take 1-2 tablets (5-10 mg total) by mouth every 4 (four) hours as needed for pain.  Dispense: 20 tablet; Refill: 0  -     Symptom Control While Passing a Stone Education  -     Patient Stated Goal: Pass my stone    Hydronephrosis with urinary obstruction due to ureteral calculus    Calculus of kidney    Urinary tract stones  -     Urinalysis Macroscopic      Stone Management Plan  [facility] Stone Management 9/17/2015 7/11/2018 7/16/2018   Urinary Tract Infection No suspicion of infection No suspicion of infection No suspicion of infection   Renal Colic Asymptomatic at this time Asymptomatic at this time Asymptomatic at this time   Renal Failure No suspicion of renal failure No suspicion of renal failure No suspicion of renal failure   Current CT date 9/2/2015 7/11/2018 7/16/2018   Right sided stones? No Yes Yes   R Number of ureteral stones - 1 1   R GSD of ureteral stones - 3 3   R Location of ureteral stone - Proximal Distal   R Number of kidney stones  - 1 Renal stones unchanged from last exam   R GSD of kidney stones - < 2 < 2   R Hydronephrosis - Mild Mild   R Stone Event No current event New event Established event   Diagnosis date - 7/11/2018 -   Initial location of primary symptomatic stone - Proximal -   Initial GSD of primary symptomatic stone - 3 -   R MET status - Initiation Progression   R Current Plan - MET MET   MET - 2 week F/U 2 week F/U   Left sided stones? Yes Yes Yes   L Number of ureteral stones 1 No ureteral stones No ureteral stones   L GSD of ureteral stones 2 - -   L Location of ureteral stone Proximal - -   L Number of kidney stones  2 3 Renal stones unchanged from last exam   L GSD of kidney stones < 2 4 - 10 4 - 10   L Hydronephrosis None None None   L Stone Event New stone passed prior to visit No current event No current event   L Current Plan  Observe Observe Observe   Observe rationale - Limited stone burden with good prognosis for spontaneous passage Limited stone burden with good prognosis for spontaneous passage         Subjective:      HPI  Mr. Segundo Wilson is a 32 y.o.  male returning to the [facility] for medical expulsive therapy follow up.     On last encounter, his 3 mm stone was in right proximal ureter with mild hydronephrosis. He has had outpatient management of inadequate pain control.    Symptoms have been poorly controlled without relief from prescribed symptom control protocol. He required call into triage line yesterday due to persistent pain. He states the pain subside earlier this morning. He has passed some blood clots but has not retrieved a stone. He is asymptomatic at present. Pertinent negative current symptoms include:  fever, left flank pain, nausea, vomiting, urinary frequency and dysuria.     New CT scan was personally reviewed and demonstrates progression of stone to right distal ureter with stable mild hydronephrosis.     PLAN    33 yo M with poorly controlled renal colic on outpatient basis, currently asymptomatic. Distal progression of obstructing right ureteral stone, now at [facility]. Progression of stone disease over past 3 years with bilateral, non-obstructing renal stones.    He is encouraged by the movement of his stone and will continue to attempt to pass stone and will return in 2 weeks without further imaging.     Patient also seen and examined by Lilliana Bowling PA-C       ROS   Review of systems is negative except for HPI.    Past Medical History:   Diagnosis Date     Kidney stones        Past Surgical History:   Procedure Laterality Date     [place] ESOPHAGOGASTRIC FUNDOPLASTY      Description: Esophagogastric Fundoplasty Nissen Fundoplication;  Recorded: 03/18/2009;       Current Outpatient Prescriptions   Medication Sig Dispense Refill     oxyCODONE (ROXICODONE) 5 MG immediate release  tablet Take 1-2 tablets (5-10 mg total) by mouth every 4 (four) hours as needed for pain. 20 tablet 0     tamsulosin (FLOMAX) 0.4 mg Cp24 Take 1 capsule (0.4 mg total) by mouth daily for 14 days. 14 capsule 1     No current facility-administered medications for this visit.        No Known Allergies    Social History     Social History     Marital status:      Spouse name: N/A     Number of children: N/A     Years of education: N/A     Occupational History     [employer]     Social History Main Topics     Smoking status: Never Smoker     Smokeless tobacco: Never Used     Alcohol use Yes      Comment: occ social drink     Drug use: No     Sexual activity: Yes     Partners: Female     Birth control/ protection: None     Other Topics Concern     Not on file     Social History Narrative       Family History   Problem Relation Age of Onset     Diabetes Father      Urolithiasis Father      single stone     Clotting disorder Neg Hx      Gout Neg Hx      Cancer Neg Hx      Heart disease Neg Hx      Objective:      Physical Exam  Vitals:    07/16/18 1418   BP: (!) 136/91   Pulse: 80   Temp: 97.9  F (36.6  C)     General - well developed, well nourished, appropriate for age. Appears no distress at this time  Abdomen - soft, non-tender, no hepatosplenomegaly, no masses.   - no flank tenderness, no suprapubic tenderness, kidney and bladder non-palpable  MSK - normal spinal curvature. no spinal tenderness. normal gait. muscular strength intact.  Psych - oriented to time, place, and person, normal mood and affect.      Labs   Urinalysis POC (Office):  Blood UA   Date Value Ref Range Status   09/17/2015 Negative Negative Final     Nitrite, UA   Date Value Ref Range Status   07/16/2018 Negative Negative Final   07/11/2018 Negative Negative Final   07/10/2018 Negative Negative Final     Leukocytes, UA    Date Value Ref Range Status   09/17/2015 Negative Negative Final     pH UA   Date Value Ref  Range Status   09/17/2015 5.5 4.5 - 8.0 Final       Lab Urinalysis:  Blood, UA   Date Value Ref Range Status   07/16/2018 Trace (!) Negative Final   07/11/2018 Trace (!) Negative Final   07/10/2018 Large (!) Negative Final     Nitrite, UA   Date Value Ref Range Status   07/16/2018 Negative Negative Final   07/11/2018 Negative Negative Final   07/10/2018 Negative Negative Final     Leukocytes, UA   Date Value Ref Range Status   07/16/2018 Trace (!) Negative Final   07/11/2018 Negative Negative Final   07/10/2018 Negative Negative Final     pH, UA   Date Value Ref Range Status   07/16/2018 5.5 5.0 - 8.0 Final   07/11/2018 5.5 5.0 - 8.0 Final   07/10/2018 6.0 4.5 - 8.0 Final

## 2021-06-19 NOTE — PROGRESS NOTES
Assessment/Plan:        Diagnoses and all orders for this visit:    Calculus of ureter  -     Symptom Control While Passing a Stone Education  -     CT Abdomen Pelvis Without Oral Without IV Contrast; Future; Expected date: 7/25/18  -     tamsulosin (FLOMAX) 0.4 mg Cp24; Take 1 capsule (0.4 mg total) by mouth daily for 14 days.  Dispense: 14 capsule; Refill: 1  -     oxyCODONE (ROXICODONE) 5 MG immediate release tablet; Take 1-2 tablets (5-10 mg total) by mouth every 4 (four) hours as needed for pain.  Dispense: 20 tablet; Refill: 0  -     Patient Stated Goal: Pass my stone    Urinary tract stones  -     Cancel: Urinalysis Macroscopic  -     Urinalysis Macroscopic    Right flank pain  -     CT Abdomen Pelvis Without Oral Without IV Contrast; Future; Expected date: 7/11/18    Calculus of kidney    Hydronephrosis with urinary obstruction due to ureteral calculus      Stone Management Plan  [facility] 9/17/2015   Urinary Tract Infection No suspicion of infection   Renal Colic Asymptomatic at this time   Renal Failure No suspicion of renal failure   Current CT date 9/2/2015   Right sided stones? No   R Stone Event No current event   Left sided stones? Yes   L Number of ureteral stones 1   L GSD of ureteral stones 2   L Location of ureteral stone Proximal   L Number of kidney stones  2   L GSD of kidney stones < 2   L Hydronephrosis None   L Stone Event New stone passed prior to visit   L Current Plan Observe         Subjective:      HPI  Mr. Segundo Wilson is a 32 y.o.  male presenting to the [facility] following recent [facility] ED visit for flank pain.    He is a rapidly recurrent calcium oxalate stone former who has not required stone clearance procedures. He has not previously participated in stone risk evaluation. He has no identified modifiable stone risk factors. He has identified non-modifiable stone risks including:  early age at first stone.    Primary symptom occurring  yesterday was acute onset right flank pain x 5 hours. The apin was intermittent and he initially attributed it to kassie, but found no relief with peptobismol. The pain worsened in severity, radiating to the right side/abdomen. At its worst, it was 8/10. He recalls similar pain with prior stone events. Significant associated symptoms at presentation included:  Sweats, chills, nausea and loose stools. Evaluation in ED was notable for microscopic hematuria. Ultrasound reported mild right pelviectasis. Given his pain completed abated, it was assumed he passed a stone and no further imaging was obtained.     He states upon returning home, he had recurrent severe right side pain, which abated after a few hours. He is asymptomatic at present. He denies current symptoms of fever, chills, flank pain, nausea, vomiting, urinary frequency and dysuria.     CT scan is personally reviewed and demonstrates a mildly obstructing 3 mm right proximal ureteral stone. There is an additional < 2 mm right upper pole stone. Progression of left renal stone burden with 3 stones, largest 4-5 mm within lower pole.    Significant labs from presentation include severe hematuria, no pyuria, negative nitrite, no bacteria, normal WBC, normal creatinine and normal potassium.    PLAN    33 yo M with rapidly recurrent stone disease, recent right flank pain with newly diagnosed obstructing right ureteral stone, currently asymptomatic. Progression of stone disease over past 3 years with bilateral, non-obstructing renal stones.    Will proceed with medical expulsive therapy. Risks and benefits were detailed of medical expulsive therapy including probability of stone passage, recurrent renal colic, and requirement of emergency medical and/or surgical care and further imaging. Patient verbalized understanding. Patient agrees with plan as discussed. He will return in 2 weeks with low dose CT scan.    For symptom control, he was prescribed oxycodone and  Flomax. Over the counter symptom control medications of ibuprofen, Dramamine and Tylenol were recommended.    Patient also seen and examined by DARREN Bill   Review of Systems  Review of systems is negative except for HPI    Past Medical History:   Diagnosis Date     Kidney stones        Past Surgical History:   Procedure Laterality Date     [place] ESOPHAGOGASTRIC FUNDOPLASTY      Description: Esophagogastric Fundoplasty Nissen Fundoplication;  Recorded: 03/18/2009;       No current outpatient prescriptions on file.     No current facility-administered medications for this visit.        No Known Allergies    Social History     Social History     Marital status:      Spouse name: N/A     Number of children: N/A     Years of education: N/A     Occupational History     [employer]     Social History Main Topics     Smoking status: Never Smoker     Smokeless tobacco: Not on file     Alcohol use Yes      Comment: occ social drink     Drug use: No     Sexual activity: Yes     Partners: Female     Birth control/ protection: None     Other Topics Concern     Not on file     Social History Narrative       Family History   Problem Relation Age of Onset     Diabetes Father      Urolithiasis Father      single stone     Clotting disorder Neg Hx      Gout Neg Hx      Cancer Neg Hx      Heart disease Neg Hx        Objective:      Physical Exam  Vitals:    07/11/18 1108   BP: 136/87   Pulse: 80   Temp: 97.8  F (36.6  C)     General - well developed, well nourished, appropriate for age. Appears no distress at this time  Abdomen - slender soft, non-tender, no hepatosplenomegaly, no masses.   - no flank tenderness, no suprapubic tenderness, kidney and bladder non-palpable  MSK - normal spinal curvature. no spinal tenderness. normal gait. muscular strength intact.  Psych - oriented to time, place, and person, normal mood and affect.    Labs  Urinalysis POC (Office):  Blood UA   Date Value  Ref Range Status   09/17/2015 Negative Negative Final     Nitrite, UA   Date Value Ref Range Status   07/11/2018 Negative Negative Final   07/10/2018 Negative Negative Final   09/17/2015 Negative Negative Final   09/02/2015 Negative Negative Final     Leukocytes, UA    Date Value Ref Range Status   09/17/2015 Negative Negative Final     pH UA   Date Value Ref Range Status   09/17/2015 5.5 4.5 - 8.0 Final       Lab Urinalysis:  Blood, UA   Date Value Ref Range Status   07/11/2018 Trace (!) Negative Final   07/10/2018 Large (!) Negative Final   09/02/2015 Large (!) Negative Final     Nitrite, UA   Date Value Ref Range Status   07/11/2018 Negative Negative Final   07/10/2018 Negative Negative Final   09/17/2015 Negative Negative Final   09/02/2015 Negative Negative Final     Leukocytes, UA   Date Value Ref Range Status   07/11/2018 Negative Negative Final   07/10/2018 Negative Negative Final   09/02/2015 Negative Negative Final     pH, UA   Date Value Ref Range Status   07/11/2018 5.5 5.0 - 8.0 Final   07/10/2018 6.0 4.5 - 8.0 Final   09/02/2015 6.0 4.5 - 8.0 Final    and Acute Labs   CBC   WBC   Date Value Ref Range Status   07/10/2018 6.7 4.0 - 11.0 thou/uL Final   08/18/2017 5.0 4.0 - 11.0 thou/uL Final   11/04/2016 4.4 4.0 - 11.0 thou/uL Final   09/02/2015 5.7 4.0 - 11.0 thou/uL Final     Hemoglobin   Date Value Ref Range Status   07/10/2018 15.2 14.0 - 18.0 g/dL Final   08/18/2017 16.7 14.0 - 18.0 g/dL Final   11/04/2016 17.1 14.0 - 18.0 g/dL Final     Platelets   Date Value Ref Range Status   07/10/2018 159 140 - 440 thou/uL Final   08/18/2017 143 140 - 440 thou/uL Final   11/04/2016 161 140 - 440 thou/uL Final    and Renal Panel  [facility]  Creatinine   Date Value Ref Range Status   07/10/2018 1.13 0.70 - 1.30 mg/dL Final   11/04/2016 1.07 0.70 - 1.30 mg/dL Final   09/21/2015 1.24 0.70 - 1.30 mg/dL Final     Potassium   Date Value Ref Range Status   07/10/2018 4.0 3.5 - 5.0 mmol/L Final   09/21/2015 3.7 3.5 - 5.0  mmol/L Final   09/17/2015 4.7 3.5 - 5.0 mmol/L Final     Calcium   Date Value Ref Range Status   07/10/2018 9.3 8.5 - 10.5 mg/dL Final   09/21/2015 10.0 8.5 - 10.5 mg/dL Final   09/17/2015 9.5 8.5 - 10.5 mg/dL Final            I, Jose Luis Sim, personally saw and examined the patient, reviewed most recent labs and imaging and agree with the above assessment

## 2021-06-20 NOTE — LETTER
Letter by Jeana Duran MD at      Author: Jeana Duran MD Service: -- Author Type: --    Filed:  Encounter Date: 5/21/2020 Status: (Other)                    My Depression Action Plan  Name: Segundo Wilson   Date of Birth 1985  Date: 5/21/2020    My Doctor: Frederick Ray MD   My Clinic: [facility]  [address]  [facility] [address]  [phone]          GREEN    ZONE   Good Control    What it looks like:     Things are going generally well. You have normal ups and downs. You may even feel depressed from time to time, but bad moods usually last less than a day.   What you need to do:  1. Continue to care for yourself (see self care plan)  2. Check your depression survival kit and update it as needed  3. Follow your physicians recommendations including any medication.  4. Do not stop taking medication unless you consult with your physician first.           YELLOW         ZONE Getting Worse    What it looks like:     Depression is starting to interfere with your life.     It may be hard to get out of bed; you may be starting to isolate yourself from others.    Symptoms of depression are starting to last most all day and this has happened for several days.     You may have suicidal thoughts but they are not constant.   What you need to do:     1. Call your care team. Your response to treatment will improve if you keep your care team informed of your progress. Yellow periods are signs an adjustment may need to be made.     2. Continue your self-care.  Just get dressed and ready for the day.  Don't give yourself time to talk yourself out of it.    3. Talk to someone in your support network.    4. Open up your depression Depression Self-Care Plan / Wellness kit.           RED    ZONE Medical Alert - Get Help    What it looks like:     Depression is seriously interfering with your life.     You may experience these or other symptoms: You cant get out of bed most days, cant work  or engage in other necessary activities, you have trouble taking care of basic hygiene, or basic responsibilities, thoughts of suicide or death that will not go away, self-injurious behavior.     What you need to do:  1. Call your care team and request a same-day appointment. If they are not available (weekends or after hours) call your local crisis line, emergency room or 911.            Self-Care Plan / Wellness Kit    Self-Care for Depression  Heres the deal. Your body and mind are really not as separate as most people think.  What you do and think affects how you feel and how you feel influences what you do and think. This means if you do things that people who feel good do, it will help you feel better.  Sometimes this is all it takes.  There is also a place for medication and therapy depending on how severe your depression is, so be sure to consult with your medical provider and/ or Behavioral Health Consultant if your symptoms are worsening or not improving.     In order to better manage my stress, I will:    Exercise  Get some form of exercise, every day. This will help reduce pain and release endorphins, the feel good chemicals in your brain. This is almost as good as taking antidepressants!  This is not the same as joining a gym and then never going! (they count on that by the way?) It can be as simple as just going for a walk or doing some gardening, anything that will get you moving.      Hygiene   Maintain good hygiene (get out of bed in the morning, make your bed, brush your teeth, take a shower, and get dressed like you were going to work, even if you are unemployed).  If your clothes don't fit try to get ones that do.    Diet  Strive to eat foods that are good for me, drink plenty of water, and avoid excessive sugar, caffeine, alcohol, and other mood-altering substances.  Some foods that are helpful in depression are: complex carbohydrates, B vitamins, flaxseed, fish or fish oil, fresh fruits and  vegetables.    Psychotherapy  Agree to participate in Individual Therapy (if recommended).    Medication  If prescribed medications, I agree to take them.  Missing doses can result in serious side effects.  I understand that drinking alcohol, or other illicit drug use, may cause potential side effects.  I will not stop my medication abruptly without first discussing it with my provider.    Staying Connected With Others  Stay in touch with my friends, family members, and my primary care provider/team.    Use your imagination  Be creative.  We all have a creative side; it doesnt matter if its oil painting, sand castles, or mud pies! This will also kick up the endorphins.    Witness Beauty  (AKA stop and smell the roses) Take a look outside, even in mid-winter. Notice colors, textures. Watch the squirrels and birds.     Service to others  Be of service to others.  There is always someone else in need.  By helping others we can get out of ourselves and remember the really important things.  This also provides opportunities for practicing all the other parts of the program.    Humor  Laugh and be silly!  Adjust your TV habits for less news and crime-drama and more comedy.    Control your stress  Try breathing deep, massage therapy, biofeedback, and meditation. Find time to relax each day.     Crisis Text Line  http://www.crisistextline.org    The Crisis Text Line serves anyone, in any type of crisis, providing access to free, 24/7 support and information via the medium people already use and trust:    Here's how it works:  1.  Text 712-994 from anywhere in the [place], anytime, about any type of crisis.  2.  A live, trained Crisis Counselor receives the text and responds quickly.  3.  The volunteer Crisis Counselor will help you move from a 'hot moment to a cool moment'.  My support system    Clinic Contact:  Phone number:    Contact 1:  Phone number:    Contact 2:  Phone number:    Confucianism/:  Phone  number:    Therapist:  Phone number:    [facility] crisis center:    Phone number:    Other community support:  Phone number:

## 2021-06-24 NOTE — PROGRESS NOTES
"  Assessment/Plan:     Patient presents to clinic with symptoms consistent with acid reflux, although the cough could be from a different etiology.  We will do a trial of ranitidine once or twice daily and avoiding acid reflux triggering foods.  Patient was given a handout on behavioral modifications and we could consider PPI in the future if patient is not noticing any improvement.    Problem List Items Addressed This Visit     None      Visit Diagnoses     Gastroesophageal reflux disease without esophagitis    -  Primary    Relevant Medications    ranitidine (ZANTAC) 150 MG tablet          Return to clinic in 1 month if not improved.    Total time spent with patient was 15 minutes with greater than 50% spent in face-to-face counseling regarding the above plan.    Subjective:      Segundo Wilson is a 33 y.o. male who presents to clinic for acid reflux.  Patient does have a history of esophagogastric fundoplasty.    Patient has been having symptoms for proximally one year.  It is described as a coughing fit after eating almost anything and bad heartburn.  Originally, he thought it was an allergy.  He did try some elimination diet, but could not discover a source.  He now believes that he has acid reflux and would like to be treated.  He is not taking any medication.    Past Medical History, Family History, and Social History reviewed.     Current Outpatient Medications on File Prior to Visit   Medication Sig Dispense Refill     [DISCONTINUED] oxyCODONE (ROXICODONE) 5 MG immediate release tablet Take 1-2 tablets (5-10 mg total) by mouth every 4 (four) hours as needed for pain. 20 tablet 0     No current facility-administered medications on file prior to visit.        Review of systems is as stated in HPI.  Endorses: wheezing, fatigue and back pain  The remainder of the 10 system review is otherwise negative.    Objective:     BP (!) 140/98   Pulse 73   Ht 5' 10\" (1.778 m)   Wt 208 lb (94.3 kg)   SpO2 97%   BMI " 29.84 kg/m   Body mass index is 29.84 kg/m .    Gen: Alert, NAD, appears stated age, normal hygiene   ENT/mouth: nares clear, septum midline, absent rhinorrhea, absent pharyngeal injection, neck is supple  ABD: non-tender to palpation, nondistended, no epigastric pain to palpation      This note has been dictated using voice recognition software. Any grammatical or context distortions are unintentional and inherent to the the software.     Mary Hirsch MD

## 2021-06-25 NOTE — PROGRESS NOTES
Assessment/Plan:        Diagnoses and all orders for this visit:    Calculus of ureter  -     Cancel: Urinalysis Macroscopic  -     Cancel: Urinalysis Macroscopic  -     Symptom Control While Passing a Stone Education  -     CT Abdomen Pelvis Without Oral Without IV Contrast; Future; Expected date: 04/04/2019  -     tamsulosin (FLOMAX) 0.4 mg cap; Take 1 capsule (0.4 mg total) by mouth daily for 14 days.  Dispense: 14 capsule; Refill: 1  -     oxyCODONE (ROXICODONE) 5 MG immediate release tablet; Take 1-2 tablets (5-10 mg total) by mouth every 4 (four) hours as needed for pain.  Dispense: 10 tablet; Refill: 0  -     Patient Stated Goal: Pass my stone    Renal colic    Calculus of kidney    Other orders  -     ibuprofen (ADVIL,MOTRIN) 400 MG tablet; Take 400 mg by mouth every 6 (six) hours as needed for pain.  -     dimenhyDRINATE (DRAMAMINE) 50 MG tablet; Take 50 mg by mouth at bedtime as needed.  -     ketorolac injection 10 mg (TORADOL)  -     acetaminophen tablet 1,000 mg (TYLENOL)  -     ketorolac (TORADOL) 15 mg/mL injection  -     acetaminophen (TYLENOL) 500 MG tablet      Stone Management Plan  [facility] Stone Management 7/11/2018 7/16/2018 3/21/2019   Urinary Tract Infection No suspicion of infection No suspicion of infection No suspicion of infection   Renal Colic Asymptomatic at this time Asymptomatic at this time Inadequate outpatient management of symptoms   Renal Failure No suspicion of renal failure No suspicion of renal failure No suspicion of renal failure   Current CT date 7/11/2018 7/16/2018 3/20/2019   Right sided stones? Yes Yes Yes   R Number of ureteral stones 1 1 No ureteral stones   R GSD of ureteral stones 3 3 -   R Location of ureteral stone Proximal Distal -   R Number of kidney stones  1 Renal stones unchanged from last exam Renal stones unchanged from last exam   R GSD of kidney stones < 2 < 2 < 2   R Hydronephrosis Mild Mild None   R Stone Event New event Established event No current event    Diagnosis date 7/11/2018 - -   Initial location of primary symptomatic stone Proximal - -   Initial GSD of primary symptomatic stone 3 - -   R MET status Initiation Progression -   R Current Plan MET MET Observe   MET 2 week F/U 2 week F/U -   Observe rationale - - Limited stone burden with good prognosis for spontaneous passage   Left sided stones? Yes Yes Yes   L Number of ureteral stones No ureteral stones No ureteral stones 1   L GSD of ureteral stones - - 5   L Location of ureteral stone - - Proximal   L Number of kidney stones  3 Renal stones unchanged from last exam 2   L GSD of kidney stones 4 - 10 4 - 10 2 - 4   L Hydronephrosis None None Mild   L Stone Event No current event No current event New event   Diagnosis date - - 3/20/2019   Initial location of primary symptomatic stone - - Proximal   Initial GSD of primary symptomatic stone - - 5   L MET Status - - Initiation   L Current Plan Observe Observe MET   MET - - 2 week F/U   Observe rationale Limited stone burden with good prognosis for spontaneous passage Limited stone burden with good prognosis for spontaneous passage -         Subjective:      HPI  Mr. Segundo Wilson is a 33 y.o.  male returning to the [facility] [place] following recent [facility] ER visit for urolithiasis.    He was last seen in July with for trial of passage with CT demonstrating progression of right ureteral stone to UVJ. He was to return for trial of passage follow up but did not. He confirms passing the stone but did not turn it in. There were additional bilateral renal stones with progression of left sided stone burden.    He is a rapidly recurrent calcium oxalate stone former who has not required stone clearance procedures. He has not previously participated in stone risk evaluation. He has no identified modifiable stone risk factors. He has identified non-modifiable stone risks including:  early age at first stone.    He developed acute onset left flank  Chronic systolic heart failure    Gout    Heart failure    HLD (hyperlipidemia)    HTN (hypertension) pain 3 days ago. The pain was initially mild and abated within an hour without intervention. It recurred several hours later with more intensity and radiated into the left lower abdomen. It changed in quality to pressure-like. He has had different pain with previous stone events and was concerned of another issue, prompting ER visit. He had no associated symptoms. Workup demonstrated migration of previous left renal stone to left proximal ureter with associated obstruction. Labs were unremarkable for infection or renal injury. He was sent home oxycodone.    He required use of ibuprofen and oxycodone early this morning for pain. Significant current symptoms include:  7/10 left flank pain. Pertinent negative current symptoms include:  fever, chills, right flank pain, nausea, vomiting, hematuria, urinary frequency and dysuria.    CT scan from 3/20/19 is personally reviewed and demonstrates a mildly obstructing 4 mm left proximal ureteral stone. Additional left renal stones x 2, largest 3 mm. Interval passage of previous right distal ureteral stone.    Significant labs from presentation include mild hematuria, no pyuria, negative nitrite, no bacteria, normal WBC, normal creatinine and normal potassium.    Will proceed with medical expulsive therapy. Risks and benefits were detailed of medical expulsive therapy including probability of stone passage, recurrent renal colic, and requirement of emergency medical and/or surgical care and further imaging. Patient verbalized understanding. Patient agrees with plan as discussed. He will return in 2 weeks with low dose CT scan.    For symptom control, he was prescribed oxycodone and Flomax. Over the counter symptom control medications of ibuprofen, Dramamine and Tylenol were recommended.    Patient also seen and examined by Lilliana Bowling PA-C     ROS   Review of systems is negative except for HPI.    Past Medical History:   Diagnosis Date     GERD (gastroesophageal reflux  disease)      Kidney stones        Past Surgical History:   Procedure Laterality Date     [place] ESOPHAGOGASTRIC FUNDOPLASTY      Description: Esophagogastric Fundoplasty Nissen Fundoplication;  Recorded: 03/18/2009;       Current Outpatient Medications   Medication Sig Dispense Refill     dimenhyDRINATE (DRAMAMINE) 50 MG tablet Take 50 mg by mouth at bedtime as needed.       ibuprofen (ADVIL,MOTRIN) 400 MG tablet Take 400 mg by mouth every 6 (six) hours as needed for pain.       oxyCODONE (ROXICODONE) 5 MG immediate release tablet Take 1 tablet (5 mg total) by mouth every 4 (four) hours as needed for pain. 6 tablet 0     ranitidine (ZANTAC) 150 MG tablet Take 1 tablet (150 mg total) by mouth 2 (two) times a day. 180 tablet 1     oxyCODONE (ROXICODONE) 5 MG immediate release tablet Take 1-2 tablets (5-10 mg total) by mouth every 4 (four) hours as needed for pain. 10 tablet 0     tamsulosin (FLOMAX) 0.4 mg cap Take 1 capsule (0.4 mg total) by mouth daily for 14 days. 14 capsule 1     Current Facility-Administered Medications   Medication Dose Route Frequency Provider Last Rate Last Dose     acetaminophen (TYLENOL) 500 MG tablet              acetaminophen tablet 1,000 mg (TYLENOL)  1,000 mg Oral Once Lilliana Bowling PA-C         ketorolac (TORADOL) 15 mg/mL injection              ketorolac injection 10 mg (TORADOL)  10 mg Intramuscular Once Lilliana Bowling PA-C           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     Employer: [employer]   Social Needs     Financial resource strain: Not on file     Food insecurity:     Worry: Not on file     Inability: Not on file     Transportation needs:     Medical: Not on file     Non-medical: Not on file   Tobacco Use     Smoking status:  Never Smoker     Smokeless tobacco: Never Used   Substance and Sexual Activity     Alcohol use: Yes     Comment: occ social drink     Drug use: No     Sexual activity: Yes     Partners: Female     Birth control/protection: None   Lifestyle     Physical activity:     Days per week: Not on file     Minutes per session: Not on file     Stress: Not on file   Relationships     Social connections:     Talks on phone: Not on file     Gets together: Not on file     Attends Adventism service: Not on file     Active member of club or organization: Not on file     Attends meetings of clubs or organizations: Not on file     Relationship status: Not on file     Intimate partner violence:     Fear of current or ex partner: Not on file     Emotionally abused: Not on file     Physically abused: Not on file     Forced sexual activity: Not on file   Other Topics Concern     Not on file   Social History Narrative     Not on file       Family History   Problem Relation Age of Onset     Diabetes Father      Urolithiasis Father         single stone     Clotting disorder Neg Hx      Gout Neg Hx      Cancer Neg Hx      Heart disease Neg Hx      Objective:      Physical Exam  Vitals:    03/21/19 0904   BP: (!) 178/98   Pulse: 71   Temp: 97.7  F (36.5  C)     General - well developed, well nourished, appropriate for age. Appears mildly uncomfortable   Heart - regular rate and rhythm, no murmur  Respiratory - normal effort, clear to auscultation, good air entry without adventitious noises  Abdomen - slender soft, non-tender, no hepatosplenomegaly, no masses.   - left flank  mild tenderness, no suprapubic tenderness, kidney and bladder non-palpable  MSK - normal spinal curvature. no spinal tenderness. normal gait. muscular strength intact.  Neurology - cranial nerves II-XII grossly intact, normal sensation, no unsteadiness  Skin - intact, no bruising, no gouty tophi  Psych - oriented to time, place, and person, normal mood and  affect.      Labs   Urinalysis POC (Office):  Blood UA   Date Value Ref Range Status   09/17/2015 Negative Negative Final     Nitrite, UA   Date Value Ref Range Status   03/20/2019 Negative Negative Final   07/16/2018 Negative Negative Final   07/11/2018 Negative Negative Final     Leukocytes, UA    Date Value Ref Range Status   09/17/2015 Negative Negative Final     pH UA   Date Value Ref Range Status   09/17/2015 5.5 4.5 - 8.0 Final       Lab Urinalysis:  Blood, UA   Date Value Ref Range Status   03/20/2019 Small (!) Negative Final   07/16/2018 Trace (!) Negative Final   07/11/2018 Trace (!) Negative Final     Nitrite, UA   Date Value Ref Range Status   03/20/2019 Negative Negative Final   07/16/2018 Negative Negative Final   07/11/2018 Negative Negative Final     Leukocytes, UA   Date Value Ref Range Status   03/20/2019 Negative Negative Final   07/16/2018 Trace (!) Negative Final   07/11/2018 Negative Negative Final     pH, UA   Date Value Ref Range Status   03/20/2019 6.0 4.5 - 8.0 Final   07/16/2018 5.5 5.0 - 8.0 Final   07/11/2018 5.5 5.0 - 8.0 Final    and Acute Labs   CBC   WBC   Date Value Ref Range Status   03/20/2019 5.4 4.0 - 11.0 thou/uL Final   07/10/2018 6.7 4.0 - 11.0 thou/uL Final   08/18/2017 5.0 4.0 - 11.0 thou/uL Final   09/02/2015 5.7 4.0 - 11.0 thou/uL Final     Hemoglobin   Date Value Ref Range Status   03/20/2019 16.1 14.0 - 18.0 g/dL Final   07/10/2018 15.2 14.0 - 18.0 g/dL Final   08/18/2017 16.7 14.0 - 18.0 g/dL Final     Platelets   Date Value Ref Range Status   03/20/2019 163 140 - 440 thou/uL Final   07/10/2018 159 140 - 440 thou/uL Final   08/18/2017 143 140 - 440 thou/uL Final    and Renal Panel  [facility]  Creatinine   Date Value Ref Range Status   03/20/2019 1.08 0.70 - 1.30 mg/dL Final   07/10/2018 1.13 0.70 - 1.30 mg/dL Final   11/04/2016 1.07 0.70 - 1.30 mg/dL Final     Potassium   Date Value Ref Range Status   03/20/2019 4.0 3.5 - 5.0 mmol/L Final   07/10/2018 4.0 3.5 - 5.0 mmol/L  Final   09/21/2015 3.7 3.5 - 5.0 mmol/L Final     Calcium   Date Value Ref Range Status   03/20/2019 9.4 8.5 - 10.5 mg/dL Final   07/10/2018 9.3 8.5 - 10.5 mg/dL Final   09/21/2015 10.0 8.5 - 10.5 mg/dL Final

## 2021-06-25 NOTE — PATIENT INSTRUCTIONS - HE
Patient Stated Goal: Pass my stone  Symptom Control While Passing A Stone    The goal of [facility] is to let a smaller kidney stone (less than 4 to 5 mm) pass without intervention if possible. Giving your body a chance to clear the stone may take a few hours up to a few weeks.  Keeping you well-informed, safe and fairly comfortable is important.    Drink to thirst  Do not attempt to  flush out  your stone by drinking too much fluid. This does not work and may increase nausea. Drink enough to satisfy your body s thirst. Eating your normal diet is fine.   Medications (that may be suggested or prescribed)    Ibuprofen (Advil or Motrin) Available over the counter  o Take two (200mg) tablets every six hours until the stone passes.  o Prevents spasm of the ureter.    o Decreases pain.      Dramamine* (drowsy version, non-generic formulation) Available over the counter  o Take 50mg at bedtime  o Decreases spasms of the ureter  o Decreases nausea  o Decreases acute pain  o Decreases recurrence of pain for 24 hours  o Will help you sleep  *This medication will cause increase drowsiness, do not drive or operate machinery for 6 hours.      Narcotics (Percocet, Vicodin, Dilaudid) Take as prescribed for severe pain unrelieved by ibuprofen and Dramamine  o Narcotics have significant side effects and only  cover-up  pain. They have no effect on the cause of pain.  o Common side effects  - Confusion, disorientation and sedation - DO NOT DRIVE OR OPERATE MACHINERY WITHIN 24 HOURS  - Nausea - take Dramamine or Zofran or Haldol to help control  - Constipation  - Sleep disturbances      Ondansetron (Zofran) Take as prescribed  o Reserve for severe nausea  o May cause constipation, start over the counter Miralax if needed      Second Line Anti-Nausea Medication: Adding a different anti-nausea medication maybe helpful for persistent nausea.  The combined effect of different types of anti-nausea medications maybe more  effective than either medication by itself, even in higher doses.  o Compazine: Take as prescribed      Information about kidney stones    Crystals can form if chemicals are too concentrated in your urine. If the crystal grows over time, a stone may form. A stone usually isn t painful while it is still in the kidney.    As the stone begins to leave the kidney, you may experience episodes of flank pain as the kidney stone approaches the entrance to the ureter. Some people feel a vague ache in the side.    Kidney stones may fall into the ureter. Some stones are tiny and pass through without causing symptoms. The ureter is a small tube (approximately 1/8 of an inch wide). A kidney stone can get stuck and block the ureter. If this happens, urine backs up and flows back to the kidney. Back pressure on the kidney can cause:  o Severe flank pain radiating to the groin.  o Severe nausea and vomiting.  o The pain can occur in the lower back, side, groin or all three.      When the stone reaches the lower ureter, this can irritate the bladder and sensations of feeling the urge to urinate frequently and urgently may occur.      Once the stone passes out of your ureter and into your bladder, the symptoms of urgency and frequency will often disappear. Sometimes pain will come back for a short period and will not be as severe as before. The passage of the stone from your bladder and out of your body is usually not a problem. The urethra is at least twice as wide as the normal ureter, so the stone doesn t usually block it.    Strain all urine  If you pass the stone, save it. Place it in the container we have provided and bring it to the [facility] within a week of passing it. Your stone will then be sent for analysis which takes about a month.     Signs and symptoms you might experience    Nausea    Decreased appetite    Urinary frequency    Bloody urine     Chills    Fatigue    When to call [facility] or  go to the Emergency Room    Fever with a temperature greater than 100.1    Severe pain    Persistent nausea/vomiting    If the pain worsens or nausea/vomiting is uncontrolled with medications, STOP eating & drinking. You need to have an empty stomach for 8 hours prior to surgery. Call the [facility] immediately at [phone].           Follow-up    Low dose CT scan with doctor visit 1-2 weeks after initial clinic visit per doctor s instructions    Please cancel the CT scan visit if you pass a stone. Reschedule for a one month follow-up with doctor to discuss stone composition and future prevention.    Preventing future stones    Approximately a month after your stone is sent out for analysis, a prevention visit will occur with your provider, to discuss an individualized plan for prevention of new stones and to discuss managing stones that you may still have. Along with the analysis of the kidney stone, blood and urine tests may be indicated to develop this plan. Knowing the type of kidney stones you make, and why, allows the providers at the [facility] to recommend specific ways to prevent them.    Follow-up visits are an important part of monitoring and preventing future re-occurrences.    The [facility] is available for questions or concerns 24 hours a day at [phone]

## 2021-06-26 ENCOUNTER — HEALTH MAINTENANCE LETTER (OUTPATIENT)
Age: 36
End: 2021-06-26

## 2021-06-29 NOTE — PROGRESS NOTES
Progress Notes by Addison Hu MD at 6/19/2020 11:30 AM     Author: Addison Hu MD Service: -- Author Type: Physician    Filed: 6/30/2020  8:31 AM Encounter Date: 6/19/2020 Status: Signed    : Addison Hu MD (Physician)             HPI:  This is a 34 y.o. male here today with concerns of pain and bulging in his umbilicus area. He has noted this for the past few week(s). The symptoms have progressed and increased over this time. He comes in for evaluation secondary to the hernia causing enough issues to bother them with daily activities or chores.    Allergies:Patient has no known allergies.    Past Medical History:   Diagnosis Date   ? GERD (gastroesophageal reflux disease)    ? Kidney stones        Past Surgical History:   Procedure Laterality Date   ? CYSTOSCOPY W/ URETERAL STENT PLACEMENT Left     urethral dilation, ureteroscopy   ? [place] CYSTO/URETERO W/LITHOTRIPSY &INDWELL STENT INSRT Left 4/30/2019    Procedure: CYSTOSCOPY, LEFT URETEROSCOPY LASER LITHOTRIPSY STENT REMOVAL STENT INSERTION;  Surgeon: Frederick Love MD;  Location: [facility];  Service: Urology   ? OH ESOPHAGOGASTRIC FUNDOPLASTY      Description: Esophagogastric Fundoplasty Nissen Fundoplication;  Recorded: 03/18/2009;       CURRENT MEDS:      Family History   Problem Relation Age of Onset   ? Diabetes Father    ? Urolithiasis Father         single stone   ? Clotting disorder Neg Hx    ? Gout Neg Hx    ? Cancer Neg Hx    ? Heart disease Neg Hx         reports that he has never smoked. He has never used smokeless tobacco. He reports current alcohol use. He reports that he does not use drugs.    Review of Systems - Negative except symptomatic umbilical hernia. Otherwise twelve system of review is negative.      There were no vitals filed for this visit.    There is no height or weight on file to calculate BMI.    EXAM:  General: NAD   HEENT: normocephalic, PERRLA and EOMS intact  [place]: Mucus membranes  moist  Neck: Supple  Chest: Clear to auscultation bilaterally  CV: RRR  ABD: Soft nontender and nondistended, umbilical hernia, reducible  EXT: Warm, pulses intact,   Neuro: Alert and oriented x3  Back: no CVA tenderness      IMAGES:     CT Abdomen Pelvis Without Oral Without IV Contrast (Order [id number])   Imaging   Date: 6/10/2019  Department: [facility] CT  Released By: Niki Lira  Authorizing: Segundo Torres MD    Study Result     EXAM: CT ABDOMEN PELVIS WO ORAL WO IV CONTRAST  LOCATION: [facility]  DATE/TIME: 6/10/2019 8:58 AM     INDICATION: Flank pain, stone known or suspected post-operative follow up of radiolucent stone; REG DOSE Follow-up left ureteroscopy  COMPARISON: None.  TECHNIQUE: Helical thin-section CT scan of the abdomen and pelvis was performed without oral or IV contrast. Multiplanar reformats were obtained. Dose reduction techniques were used.  CONTRAST: None.     FINDINGS:   LUNG BASES: Negative.     ABDOMEN: In the right kidney, stable 1 mm nonobstructing stone lower pole. No hydronephrosis.     In the left kidney, there are no remaining stones. The previous ureteral stones are no longer present. Resolved hydronephrosis.     No significant findings in the liver, spleen, pancreas, and adrenal glands. Postop fundoplication gastric cardia.     PELVIS: Negative.     MUSCULOSKELETAL: Negative.     IMPRESSION:   CONCLUSION:   1.  Resolution of the left ureteral stones with no remaining stones or hydronephrosis on the left side.     2.  Stable 1 mm nonobstructing stone lower pole right kidney.         Assessment/Plan: Pt with a umbilical hernia. I discussed this at length with He.  I went over conservative management as well as surgical treatment of this.. I would plan on doing this via anopen  approach with possible use of mesh. I went over the small risks of surgery including but not limited to bleeding and infection, anesthesia, recurrence rates and nerve  injury. I discussed the expected recovery time as well. Will get this scheduled. He will contact us to have this scheduled.      MD Addison Knott MD, MD  General Surgery [phone]  Vascular Surgery [phone]

## 2021-07-09 ENCOUNTER — AMBULATORY - HEALTHEAST (OUTPATIENT)
Dept: NURSING | Facility: CLINIC | Age: 36
End: 2021-07-09

## 2021-10-11 ENCOUNTER — HEALTH MAINTENANCE LETTER (OUTPATIENT)
Age: 36
End: 2021-10-11

## 2022-07-17 ENCOUNTER — HEALTH MAINTENANCE LETTER (OUTPATIENT)
Age: 37
End: 2022-07-17

## 2022-09-25 ENCOUNTER — HEALTH MAINTENANCE LETTER (OUTPATIENT)
Age: 37
End: 2022-09-25

## 2022-10-13 NOTE — PROGRESS NOTES
Patient presents to the office today for MET follow-up.  Cristine Meredith RN     53 yo man transferred from [facility] with ECMO cannulas, impella, bleeding from oral pharyngeal areas, trach collar, undergoing Hemodialysis.  Asked by ID to reevaluate for sepsis in the setting of chronic hemodialysis catheters, IV lines, trach with prior HAP/VAP with gram negative MDRO pathogens    Now with ESBL Kleb pneumo bacteremia    Patient underwent conversion of temporary HD catheter to tunneled HD catheter and developed GNR bacteremia/sepsis  Tunneled HD catheter removed and patient completed 10 days of IV antibiotics for Enterobacter ESBL bacteremia  CT of chest/abd/pelvis not revealing of alternative source although sputum also colonized with ESBL enterobacter    Temporary HD catheter removed  Perm Cath placed right subclavian region  received ly-placement Ertapenem dose  tolerated procedure  undergoing hemodialysis    Vent/trach weaning  can repeat sputum to see if ESBL organism is still a colonizer requiring contact precautions    will vaccinate with seasonal flu vaccine  will vaccinate with Covid bi-valent booster    Zach Mcgill MD  Can be called via Teams  After 5pm/weekends [phone]                       53 yo man transferred from [facility] with ECMO cannulas, impella, bleeding from oral pharyngeal areas, trach collar, undergoing Hemodialysis.  Asked by ID to reevaluate for sepsis in the setting of chronic hemodialysis catheters, IV lines, trach with prior HAP/VAP with gram negative MDRO pathogens    Now with ESBL Kleb pneumo bacteremia    Patient underwent conversion of temporary HD catheter to tunneled HD catheter and developed GNR bacteremia/sepsis  Tunneled HD catheter removed and patient completed 10 days of IV antibiotics for Enterobacter ESBL bacteremia  CT of chest/abd/pelvis not revealing of alternative source although sputum also colonized with ESBL enterobacter    Perm Cath placed right subclavian region  received ly-placement Ertapenem dose  tolerated procedure  undergoing hemodialysis    Vent/trach weaning  can repeat sputum to see if ESBL organism is still a colonizer requiring contact precautions    will vaccinate with seasonal flu vaccine  will vaccinate with Covid bi-valent booster    Zach Mcgill MD  Can be called via Teams  After 5pm/weekends [phone]

## 2023-03-14 ENCOUNTER — HOSPITAL ENCOUNTER (EMERGENCY)
Facility: CLINIC | Age: 38
Discharge: HOME | End: 2023-03-14
Attending: EMERGENCY MEDICINE | Admitting: EMERGENCY MEDICINE
Payer: COMMERCIAL

## 2023-03-14 VITALS
BODY MASS INDEX: 30.78 KG/M2 | SYSTOLIC BLOOD PRESSURE: 154 MMHG | DIASTOLIC BLOOD PRESSURE: 104 MMHG | OXYGEN SATURATION: 97 % | RESPIRATION RATE: 15 BRPM | HEIGHT: 70 IN | WEIGHT: 215 LBS | HEART RATE: 72 BPM | TEMPERATURE: 98.7 F

## 2023-03-14 DIAGNOSIS — R00.2 PALPITATIONS: ICD-10-CM

## 2023-03-14 DIAGNOSIS — I10 HYPERTENSION, UNSPECIFIED TYPE: ICD-10-CM

## 2023-03-14 LAB
ANION GAP SERPL CALCULATED.3IONS-SCNC: 8 MMOL/L (ref 5–18)
ATRIAL RATE - MUSE: 84 BPM
BASOPHILS # BLD AUTO: 0.1 10E3/UL (ref 0–0.2)
BASOPHILS NFR BLD AUTO: 1 %
BUN SERPL-MCNC: 15 MG/DL (ref 8–22)
CALCIUM SERPL-MCNC: 9.1 MG/DL (ref 8.5–10.5)
CHLORIDE BLD-SCNC: 106 MMOL/L (ref 98–107)
CO2 SERPL-SCNC: 25 MMOL/L (ref 22–31)
CREAT SERPL-MCNC: 1.27 MG/DL (ref 0.7–1.3)
DIASTOLIC BLOOD PRESSURE - MUSE: 115 MMHG
EOSINOPHIL # BLD AUTO: 0.1 10E3/UL (ref 0–0.7)
EOSINOPHIL NFR BLD AUTO: 2 %
ERYTHROCYTE [DISTWIDTH] IN BLOOD BY AUTOMATED COUNT: 12 % (ref 10–15)
GFR SERPL CREATININE-BSD FRML MDRD: 75 ML/MIN/1.73M2
GLUCOSE BLD-MCNC: 114 MG/DL (ref 70–125)
HCT VFR BLD AUTO: 43.5 % (ref 40–53)
HGB BLD-MCNC: 15.7 G/DL (ref 13.3–17.7)
IMM GRANULOCYTES # BLD: 0 10E3/UL
IMM GRANULOCYTES NFR BLD: 0 %
INTERPRETATION ECG - MUSE: NORMAL
LYMPHOCYTES # BLD AUTO: 1.9 10E3/UL (ref 0.8–5.3)
LYMPHOCYTES NFR BLD AUTO: 31 %
MAGNESIUM SERPL-MCNC: 2 MG/DL (ref 1.8–2.6)
MCH RBC QN AUTO: 32.6 PG (ref 26.5–33)
MCHC RBC AUTO-ENTMCNC: 36.1 G/DL (ref 31.5–36.5)
MCV RBC AUTO: 90 FL (ref 78–100)
MONOCYTES # BLD AUTO: 0.5 10E3/UL (ref 0–1.3)
MONOCYTES NFR BLD AUTO: 9 %
NEUTROPHILS # BLD AUTO: 3.6 10E3/UL (ref 1.6–8.3)
NEUTROPHILS NFR BLD AUTO: 57 %
NRBC # BLD AUTO: 0 10E3/UL
NRBC BLD AUTO-RTO: 0 /100
P AXIS - MUSE: 65 DEGREES
PLATELET # BLD AUTO: 175 10E3/UL (ref 150–450)
POTASSIUM BLD-SCNC: 3.6 MMOL/L (ref 3.5–5)
PR INTERVAL - MUSE: 168 MS
QRS DURATION - MUSE: 104 MS
QT - MUSE: 356 MS
QTC - MUSE: 420 MS
R AXIS - MUSE: 15 DEGREES
RBC # BLD AUTO: 4.82 10E6/UL (ref 4.4–5.9)
SODIUM SERPL-SCNC: 139 MMOL/L (ref 136–145)
SYSTOLIC BLOOD PRESSURE - MUSE: 185 MMHG
T AXIS - MUSE: 23 DEGREES
TROPONIN I SERPL-MCNC: 0.01 NG/ML (ref 0–0.29)
VENTRICULAR RATE- MUSE: 84 BPM
WBC # BLD AUTO: 6.3 10E3/UL (ref 4–11)

## 2023-03-14 PROCEDURE — 99284 EMERGENCY DEPT VISIT MOD MDM: CPT

## 2023-03-14 PROCEDURE — 85025 COMPLETE CBC W/AUTO DIFF WBC: CPT | Performed by: EMERGENCY MEDICINE

## 2023-03-14 PROCEDURE — 93005 ELECTROCARDIOGRAM TRACING: CPT | Performed by: EMERGENCY MEDICINE

## 2023-03-14 PROCEDURE — 84484 ASSAY OF TROPONIN QUANT: CPT | Performed by: EMERGENCY MEDICINE

## 2023-03-14 PROCEDURE — 250N000013 HC RX MED GY IP 250 OP 250 PS 637: Performed by: EMERGENCY MEDICINE

## 2023-03-14 PROCEDURE — 36415 COLL VENOUS BLD VENIPUNCTURE: CPT | Performed by: EMERGENCY MEDICINE

## 2023-03-14 PROCEDURE — 80048 BASIC METABOLIC PNL TOTAL CA: CPT | Performed by: EMERGENCY MEDICINE

## 2023-03-14 PROCEDURE — 83735 ASSAY OF MAGNESIUM: CPT | Performed by: EMERGENCY MEDICINE

## 2023-03-14 RX ORDER — HYDROXYZINE HYDROCHLORIDE 25 MG/1
50 TABLET, FILM COATED ORAL ONCE
Status: COMPLETED | OUTPATIENT
Start: 2023-03-14 | End: 2023-03-14

## 2023-03-14 RX ADMIN — HYDROXYZINE HYDROCHLORIDE 50 MG: 25 TABLET ORAL at 01:28

## 2023-03-14 ASSESSMENT — ENCOUNTER SYMPTOMS
PALPITATIONS: 1
CHILLS: 0
FEVER: 0
COUGH: 0
SHORTNESS OF BREATH: 0

## 2023-03-14 NOTE — ED TRIAGE NOTES
"Patient reports feeling \"shaky\" intermittently today. Checked BP, which was elevated and heart feels like it is racing. Patient stated he started omeprazole 1x daily 5 days ago      Triage Assessment     Row Name 03/14/23 0121       Triage Assessment (Adult)    Airway WDL WDL       Respiratory WDL    Respiratory WDL WDL       Skin Circulation/Temperature WDL    Skin Circulation/Temperature WDL WDL       Cardiac WDL    Cardiac WDL X  Heart racing       Peripheral/Neurovascular WDL    Peripheral Neurovascular WDL WDL       Cognitive/Neuro/Behavioral WDL    Cognitive/Neuro/Behavioral WDL WDL              "

## 2023-03-14 NOTE — DISCHARGE INSTRUCTIONS
Thankfully here your blood work and your EKG all look normal.  It is important that you follow-up with your primary care doctor in the next few days to have your blood pressure rechecked.  Return here for any new or worsening symptoms.

## 2023-03-14 NOTE — ED PROVIDER NOTES
EMERGENCY DEPARTMENT ENCOUNTER      NAME: Segundo Wilson  AGE: 37 year old male  YOB: 1985  MRN: [id number]  EVALUATION DATE & TIME: 3/14/2023  1:13 AM    PCP: Frederick Ray    ED PROVIDER: Teresa Villarreal M.D.      Chief Complaint   Patient presents with     Hypertension     Tachycardia         FINAL IMPRESSION:  1. Hypertension, unspecified type    2. Palpitations        MEDICAL DECISION MAKING:    Pertinent Labs & Imaging studies reviewed. (See chart for details)  ED Course as of 03/14/23 0218   Tue Mar 14, 2023   0124 Afebrile.  Vital signs here with hypertension, otherwise unremarkable.  Patient is coming in stating that he has been intermittently shaky today.  Says that occasionally he has felt like his heart rate was elevated and was racing.  Started checking his blood pressure at home and noticed that it started to increase every time he checked it.  Was not having any chest pain with any of this.  No shortness of breath.  No history of cardiac issues.  No significant medical history with exception of GERD for which he used to started taking omeprazole.  No falls or trauma.  No recent fevers chills.  No cough or shortness of breath.    Exam for patient here appears moderately anxious.  Otherwise completely unremarkable.    We will check some labs for patient here given his hypertension, he is not having any chest pain or chest tightness here.  I do believe this may be secondary to some anxiety but will rule out with EKG, labs, electrolytes, troponin.  Give some Vistaril to see if this does help with his anxiety.  His EKG done on arrival here shows sinus rhythm at a rate of 84.  There is no ST elevations or depressions.  No ectopy.  Intervals are intact.  Normal axis.  QTc is 420, , .  As compared with previous EKG from September 21, 2015 no significant change found.    Check labs for patient here, reevaluate after medications.       Labs, EKG unremarkable.  Blood pressure  slightly down after the Vistaril.  No longer feeling quite as anxious.  Plan for discharge to home, follow-up with primary care for blood pressure recheck.  Return for any new or worsening symptoms.      Medical Decision Making    History:    Supplemental history from: Documented in chart, if applicable    External Record(s) reviewed: Documented in chart, if applicable.    Work Up:    Chart documentation includes differential considered and any EKGs or imaging independently interpreted by provider, where specified.    In additional to work up documented, I considered the following work up: Documented in chart, if applicable.    External consultation:    Discussion of management with another provider: Documented in chart, if applicable    Complicating factors:    Care impacted by chronic illness: N/A    Care affected by social determinants of health: N/A    Disposition considerations: Discharge. No recommendations on prescription strength medication(s). See documentation for any additional details.          Critical care: 0 minutes excluding separately billable procedures.  Includes bedside management, time reviewing test results, review of records, discussing the case with staff, documenting the medical record and time spent with family members (or surrogate decision makers) discussing specific treatment issues.          ED COURSE:  1:19 AM I met with the patient, obtained history, performed an initial exam, and discussed options and plan for diagnostics and treatment here in the ED.    The importance of close follow up was discussed. We reviewed warning signs and symptoms, and I instructed Mr. Wilson to return to the emergency department immediately if he develops any new or worsening symptoms. I provided additional verbal discharge instructions. Mr. Wilson expressed understanding and agreement with this plan of care, his questions were answered, and he was discharged in stable condition.     MEDICATIONS GIVEN IN THE  EMERGENCY:  Medications   hydrOXYzine (ATARAX) tablet 50 mg (50 mg Oral $Given 3/14/23 0128)       NEW PRESCRIPTIONS STARTED AT TODAY'S ER VISIT:  New Prescriptions    No medications on file          =================================================================    HPI    Patient information was obtained from: patient    Use of : N/A         Segundo Wilson is a 37 year old male with a pertinent medical history of calculus of ureter, calculus of kidney, GERD, and dyslipidemia who presents to the emergency department via walk-in for evaluation of shakiness. Patient presents with intermittent shakiness. He felt like his heart rate was elevated and developed heart palpitations. He checked his blood pressure at home and noticed his BP gradually rising every time he checked it. His blood pressure wasn't going down, which prompted him to the ED. He has a history of GERD and takes omeprazole. Patient denies chest pain, shortness of breath, cough, fevers chills, and any other symptoms. Denies any recent falls or trauma.       REVIEW OF SYSTEMS   Review of Systems   Constitutional: Negative for chills and fever.   Respiratory: Negative for cough and shortness of breath.    Cardiovascular: Positive for palpitations. Negative for chest pain.   Neurological:        Positive for shakiness     All other systems reviewed and are negative.        PAST MEDICAL HISTORY:  Past Medical History:   Diagnosis Date     GERD (gastroesophageal reflux disease)      Kidney stones        PAST SURGICAL HISTORY:  Past Surgical History:   Procedure Laterality Date     COMBINED CYSTOSCOPY, INSERT STENT URETER(S) Left     urethral dilation, ureteroscopy     [place] CYSTO/URETERO W/LITHOTRIPSY &INDWELL STENT INSRT Left 4/30/2019    Procedure: CYSTOSCOPY, LEFT URETEROSCOPY LASER LITHOTRIPSY STENT REMOVAL STENT INSERTION;  Surgeon: Frederick Love MD;  Location: [facility];  Service: Urology     RI ESOPHAGOGASTRIC FUNDOPLASTY       "Description: Esophagogastric Fundoplasty Nissen Fundoplication;  Recorded: 03/18/2009;       CURRENT MEDICATIONS:    No current facility-administered medications for this encounter.  No current outpatient medications on file.    ALLERGIES:  No Known Allergies    FAMILY HISTORY:  Family History   Problem Relation Age of Onset     Diabetes Father      Urolithiasis Father         single stone     Clotting Disorder No family hx of      Gout No family hx of      Cancer No family hx of      Heart Disease No family hx of        SOCIAL HISTORY:   Social History     Socioeconomic History     Marital status:    Tobacco Use     Smoking status: Never     Smokeless tobacco: Never   Substance and Sexual Activity     Alcohol use: Yes     Comment: Alcoholic Drinks/day: occ social drink     Drug use: No     Sexual activity: Yes     Partners: Female     Birth control/protection: None       PHYSICAL EXAM:    Vitals: BP (!) 167/106   Pulse 70   Temp 98.7  F (37.1  C) (Oral)   Resp 12   Ht 1.778 m (5' 10\")   Wt 97.5 kg (215 lb)   SpO2 98%   BMI 30.85 kg/m     General:. Alert and interactive, Moderately anxious appearing.  HENT: Oropharynx without erythema or exudates. MMM.  TMs clear bilaterally.  Eyes: Pupils mid-sized and equally reactive.   Neck: Full AROM.  No midline tenderness to palpation.  Cardiovascular: Regular rate and rhythm. Peripheral pulses 2+ bilaterally.  Chest/Pulmonary: Normal work of breathing. Lung sounds clear and equal throughout, no wheezes or crackles. No chest wall tenderness or deformities.  Abdomen: Soft, nondistended. Nontender without guarding or rebound.  Back/Spine: No CVA or midline tenderness.  Extremities: Normal ROM of all major joints. No lower extremity edema.   Skin: Warm and dry. Normal skin color.   Neuro: Speech clear. CNs grossly intact. Moves all extremities appropriately. Strength and sensation grossly intact to all extremities.   Psych: Normal affect/mood, cooperative, memory " appropriate.     LAB:  All pertinent labs reviewed and interpreted.  Labs Ordered and Resulted from Time of ED Arrival to Time of ED Departure   BASIC METABOLIC PANEL - Normal       Result Value    Sodium 139      Potassium 3.6      Chloride 106      Carbon Dioxide (CO2) 25      Anion Gap 8      Urea Nitrogen 15      Creatinine 1.27      Calcium 9.1      Glucose 114      GFR Estimate 75     TROPONIN I - Normal    Troponin I 0.01     MAGNESIUM - Normal    Magnesium 2.0     CBC WITH PLATELETS AND DIFFERENTIAL    WBC Count 6.3      RBC Count 4.82      Hemoglobin 15.7      Hematocrit 43.5      MCV 90      MCH 32.6      MCHC 36.1      RDW 12.0      Platelet Count 175      % Neutrophils 57      % Lymphocytes 31      % Monocytes 9      % Eosinophils 2      % Basophils 1      % Immature Granulocytes 0      NRBCs per 100 WBC 0      Absolute Neutrophils 3.6      Absolute Lymphocytes 1.9      Absolute Monocytes 0.5      Absolute Eosinophils 0.1      Absolute Basophils 0.1      Absolute Immature Granulocytes 0.0      Absolute NRBCs 0.0         RADIOLOGY:  No orders to display       EKG:  See MDM  I have independently reviewed and interpreted the EKG(s) documented above.         I, Kayleen Triplett, am serving as a scribe to document services personally performed by Dr. Teresa Villarreal  based on my observation and the provider's statements to me. I, Teresa Villarreal MD attest that Kayleen Triplett is acting in a scribe capacity, has observed my performance of the services and has documented them in accordance with my direction.      Teresa Villarreal M.D.  Emergency Medicine  [facility] EMERGENCY ROOM  [address] [address]  [phone]  Dept: [phone]       Teresa Villarreal MD  03/14/23 0215

## 2023-03-16 ENCOUNTER — OFFICE VISIT (OUTPATIENT)
Dept: FAMILY MEDICINE | Facility: CLINIC | Age: 38
End: 2023-03-16
Payer: COMMERCIAL

## 2023-03-16 VITALS
OXYGEN SATURATION: 98 % | SYSTOLIC BLOOD PRESSURE: 140 MMHG | WEIGHT: 212 LBS | HEART RATE: 77 BPM | TEMPERATURE: 98 F | HEIGHT: 69 IN | BODY MASS INDEX: 31.4 KG/M2 | DIASTOLIC BLOOD PRESSURE: 100 MMHG | RESPIRATION RATE: 17 BRPM

## 2023-03-16 DIAGNOSIS — Z11.59 NEED FOR HEPATITIS C SCREENING TEST: ICD-10-CM

## 2023-03-16 DIAGNOSIS — I10 ESSENTIAL HYPERTENSION: Primary | ICD-10-CM

## 2023-03-16 DIAGNOSIS — E66.811 CLASS 1 OBESITY DUE TO EXCESS CALORIES WITH SERIOUS COMORBIDITY AND BODY MASS INDEX (BMI) OF 31.0 TO 31.9 IN ADULT: ICD-10-CM

## 2023-03-16 DIAGNOSIS — E78.00 HYPERCHOLESTEROLEMIA: ICD-10-CM

## 2023-03-16 DIAGNOSIS — E66.09 CLASS 1 OBESITY DUE TO EXCESS CALORIES WITH SERIOUS COMORBIDITY AND BODY MASS INDEX (BMI) OF 31.0 TO 31.9 IN ADULT: ICD-10-CM

## 2023-03-16 DIAGNOSIS — Z11.4 SCREENING FOR HIV (HUMAN IMMUNODEFICIENCY VIRUS): ICD-10-CM

## 2023-03-16 LAB
ALBUMIN SERPL BCG-MCNC: 4.5 G/DL (ref 3.5–5.2)
ALP SERPL-CCNC: 74 U/L (ref 40–129)
ALT SERPL W P-5'-P-CCNC: 50 U/L (ref 10–50)
ANION GAP SERPL CALCULATED.3IONS-SCNC: 11 MMOL/L (ref 7–15)
AST SERPL W P-5'-P-CCNC: 40 U/L (ref 10–50)
BILIRUB SERPL-MCNC: 1.2 MG/DL
BUN SERPL-MCNC: 15.3 MG/DL (ref 6–20)
CALCIUM SERPL-MCNC: 9.7 MG/DL (ref 8.6–10)
CHLORIDE SERPL-SCNC: 106 MMOL/L (ref 98–107)
CHOLEST SERPL-MCNC: 192 MG/DL
CREAT SERPL-MCNC: 1.14 MG/DL (ref 0.67–1.17)
DEPRECATED HCO3 PLAS-SCNC: 23 MMOL/L (ref 22–29)
ERYTHROCYTE [DISTWIDTH] IN BLOOD BY AUTOMATED COUNT: 11.9 % (ref 10–15)
GFR SERPL CREATININE-BSD FRML MDRD: 85 ML/MIN/1.73M2
GLUCOSE SERPL-MCNC: 113 MG/DL (ref 70–99)
HCT VFR BLD AUTO: 44.9 % (ref 40–53)
HDLC SERPL-MCNC: 46 MG/DL
HGB BLD-MCNC: 16.5 G/DL (ref 13.3–17.7)
LDLC SERPL CALC-MCNC: 119 MG/DL
MCH RBC QN AUTO: 33.4 PG (ref 26.5–33)
MCHC RBC AUTO-ENTMCNC: 36.7 G/DL (ref 31.5–36.5)
MCV RBC AUTO: 91 FL (ref 78–100)
NONHDLC SERPL-MCNC: 146 MG/DL
PLATELET # BLD AUTO: 161 10E3/UL (ref 150–450)
POTASSIUM SERPL-SCNC: 4.5 MMOL/L (ref 3.4–5.3)
PROT SERPL-MCNC: 7.7 G/DL (ref 6.4–8.3)
RBC # BLD AUTO: 4.94 10E6/UL (ref 4.4–5.9)
SODIUM SERPL-SCNC: 140 MMOL/L (ref 136–145)
TRIGL SERPL-MCNC: 133 MG/DL
TSH SERPL DL<=0.005 MIU/L-ACNC: 1.04 UIU/ML (ref 0.3–4.2)
WBC # BLD AUTO: 6.5 10E3/UL (ref 4–11)

## 2023-03-16 PROCEDURE — 84443 ASSAY THYROID STIM HORMONE: CPT | Performed by: FAMILY MEDICINE

## 2023-03-16 PROCEDURE — 85027 COMPLETE CBC AUTOMATED: CPT | Performed by: FAMILY MEDICINE

## 2023-03-16 PROCEDURE — 80061 LIPID PANEL: CPT | Performed by: FAMILY MEDICINE

## 2023-03-16 PROCEDURE — 99214 OFFICE O/P EST MOD 30 MIN: CPT | Performed by: FAMILY MEDICINE

## 2023-03-16 PROCEDURE — 80053 COMPREHEN METABOLIC PANEL: CPT | Performed by: FAMILY MEDICINE

## 2023-03-16 PROCEDURE — 36415 COLL VENOUS BLD VENIPUNCTURE: CPT | Performed by: FAMILY MEDICINE

## 2023-03-16 PROCEDURE — 86803 HEPATITIS C AB TEST: CPT | Performed by: FAMILY MEDICINE

## 2023-03-16 PROCEDURE — 87389 HIV-1 AG W/HIV-1&-2 AB AG IA: CPT | Performed by: FAMILY MEDICINE

## 2023-03-16 RX ORDER — LOSARTAN POTASSIUM AND HYDROCHLOROTHIAZIDE 12.5; 5 MG/1; MG/1
1 TABLET ORAL DAILY
Qty: 30 TABLET | Refills: 1 | Status: SHIPPED | OUTPATIENT
Start: 2023-03-16 | End: 2023-05-10

## 2023-03-16 ASSESSMENT — PAIN SCALES - GENERAL: PAINLEVEL: NO PAIN (0)

## 2023-03-16 NOTE — PROGRESS NOTES
Assessment/Plan:    Essential hypertension  Hypertension new diagnosis and will initiate losartan hydrochlorothiazide 50/12.5 use 1 tablet daily and reassess in office in 4 weeks to ensure desired blood pressure with goal less than 130/80 ideally.  Secondary hypertension work-up with TSH and comprehensive metabolic panel to be completed today.  Recent CBC normal as well as magnesium and troponin I.  No evidence for CKD at time of ER assessment.  - losartan-hydrochlorothiazide (HYZAAR) 50-12.5 MG tablet  Dispense: 30 tablet; Refill: 1  - CBC with platelets  - Comprehensive metabolic panel  - TSH    Hypercholesterolemia  Has had mild cholesterol elevation historically.  Nonfasting today.  Nonfasting lipid cascade to be updated.  - Lipid panel reflex to direct LDL Non-fasting    Screening for HIV (human immunodeficiency virus)  Routine HIV screen based on age criteria.  - HIV Antigen Antibody Combo    Need for hepatitis C screening test  Routine hepatitis C screen based on age criteria.  - Hepatitis C Screen Reflex to HCV RNA Quant and Genotype    Class 1 obesity due to excess calories with serious comorbidity and body mass index (BMI) of 31.0 to 31.9 in adult  Dietary and exercise modifications for weight goal less than 200 pounds initially, less than 190 pounds ideally.      The following high BMI interventions were performed this visit: encouragement to exercise, weight monitoring, weight loss from baseline weight and lifestyle education regarding diet         Subjective:    Segundo DAIGLE Steve is seen today for elevated blood pressures outside of office.  No history of hypertension.  Has not been on medication.  Past medical social and family history reviewed and updated as noted below without strong family history of hypertension or premature CAD.  Was seen in the ER March 14, 2023 for elevated blood pressure as well has palpitations.  Obesity noted with lack of consistent exercise with BMI 31.77.  Has had cholesterol  "elevation historically, mild, diet controlled.     - [place]   1 daughter - Shaggy (18 months old)   Tobacco: none   EtOH: occ   Mom -   Dad - type 2 DM   1 older bro - Ashok   Surgeries: pyloric stenosis?   Hospitalizations:   H/O kidney stones (Dr. 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[place])   Hobbies: sports (golf, bowl)    Past Surgical History:   Procedure Laterality Date     COMBINED CYSTOSCOPY, INSERT STENT URETER(S) Left     urethral dilation, ureteroscopy     [place] CYSTO/URETERO W/LITHOTRIPSY &INDWELL STENT INSRT Left 4/30/2019    Procedure: CYSTOSCOPY, LEFT URETEROSCOPY LASER LITHOTRIPSY STENT REMOVAL STENT INSERTION;  Surgeon: Frederick Love MD;  Location: [facility];  Service: Urology     KY ESOPHAGOGASTRIC FUNDOPLASTY      Description: Esophagogastric Fundoplasty Nissen Fundoplication;  Recorded: 03/18/2009;        Family History   Problem Relation Age of Onset     Diabetes Father      Urolithiasis Father         single stone     Clotting Disorder No family hx of      Gout No family hx of      Cancer No family hx of      Heart Disease No family hx of         Past Medical History:   Diagnosis Date     GERD (gastroesophageal reflux disease)      Kidney stones         Social History     Tobacco Use     Smoking status: Never     Smokeless tobacco: Never   Substance Use Topics     Alcohol use: Yes     Comment: Alcoholic Drinks/day: [facility] social drink     Drug use: No        Current Outpatient Medications   Medication Sig Dispense Refill     losartan-hydrochlorothiazide (HYZAAR) 50-12.5 MG tablet Take 1 tablet by mouth daily 30 tablet 1     omeprazole (PRILOSEC) 20 MG DR capsule Take 20 mg by mouth daily            Objective:    Vitals:    03/16/23 1421 03/16/23 1424   BP: (!) 150/100 (!) 140/100   Pulse: 77    Resp: 17    Temp: 98  F (36.7  C)    SpO2: 98%    Weight: 96.2 kg (212 lb)    Height: 1.74 m (5' 8.5\")       Body mass index is 31.77 kg/m .    Alert.  No apparent distress.  Blood " pressure on recheck 146/108.  Chest clear.  Cardiac exam regular.  Extremities warm and dry.      This note has been dictated using voice recognition software and as a result may contain minor grammatical errors and unintended word substitutions.   Answers for HPI/ROS submitted by the patient on 3/16/2023  Do you check your blood pressure regularly outside of the clinic?: Yes  Are your blood pressures ever more than 140 on the top number (systolic) OR more than 90 on the bottom number (diastolic)? (For example, greater than 140/90): Yes  Are you following a low salt diet?: No  How many servings of fruits and vegetables do you eat daily?: 0-1  On average, how many sweetened beverages do you drink each day (Examples: soda, juice, sweet tea, etc.  Do NOT count diet or artificially sweetened beverages)?: 2  How many minutes a day do you exercise enough to make your heart beat faster?: 10 to 19  How many days a week do you exercise enough to make your heart beat faster?: 3 or less  How many days per week do you miss taking your medication?: 0

## 2023-03-17 LAB
HCV AB SERPL QL IA: NONREACTIVE
HIV 1+2 AB+HIV1 P24 AG SERPL QL IA: NONREACTIVE

## 2023-04-28 ENCOUNTER — OFFICE VISIT (OUTPATIENT)
Dept: FAMILY MEDICINE | Facility: CLINIC | Age: 38
End: 2023-04-28
Payer: COMMERCIAL

## 2023-04-28 VITALS
BODY MASS INDEX: 31.4 KG/M2 | WEIGHT: 212 LBS | HEART RATE: 84 BPM | DIASTOLIC BLOOD PRESSURE: 88 MMHG | HEIGHT: 69 IN | SYSTOLIC BLOOD PRESSURE: 110 MMHG | TEMPERATURE: 98.1 F | OXYGEN SATURATION: 98 % | RESPIRATION RATE: 15 BRPM

## 2023-04-28 DIAGNOSIS — E78.00 HYPERCHOLESTEROLEMIA: ICD-10-CM

## 2023-04-28 DIAGNOSIS — I10 ESSENTIAL HYPERTENSION: Primary | ICD-10-CM

## 2023-04-28 DIAGNOSIS — Z23 ENCOUNTER FOR IMMUNIZATION: ICD-10-CM

## 2023-04-28 DIAGNOSIS — E66.811 CLASS 1 OBESITY DUE TO EXCESS CALORIES WITH SERIOUS COMORBIDITY AND BODY MASS INDEX (BMI) OF 31.0 TO 31.9 IN ADULT: ICD-10-CM

## 2023-04-28 DIAGNOSIS — E66.09 CLASS 1 OBESITY DUE TO EXCESS CALORIES WITH SERIOUS COMORBIDITY AND BODY MASS INDEX (BMI) OF 31.0 TO 31.9 IN ADULT: ICD-10-CM

## 2023-04-28 PROCEDURE — 36415 COLL VENOUS BLD VENIPUNCTURE: CPT | Performed by: FAMILY MEDICINE

## 2023-04-28 PROCEDURE — 80048 BASIC METABOLIC PNL TOTAL CA: CPT | Performed by: FAMILY MEDICINE

## 2023-04-28 PROCEDURE — 90715 TDAP VACCINE 7 YRS/> IM: CPT | Performed by: FAMILY MEDICINE

## 2023-04-28 PROCEDURE — 90471 IMMUNIZATION ADMIN: CPT | Performed by: FAMILY MEDICINE

## 2023-04-28 PROCEDURE — 99214 OFFICE O/P EST MOD 30 MIN: CPT | Mod: 25 | Performed by: FAMILY MEDICINE

## 2023-04-28 ASSESSMENT — PAIN SCALES - GENERAL: PAINLEVEL: NO PAIN (0)

## 2023-04-28 NOTE — PROGRESS NOTES
Assessment/Plan:    Essential hypertension  Hypertension improved control.  Had been seen March 16, 2023 with blood pressure 140/100 on recheck.  Started losartan hydrochlorothiazide 50/12.5 daily.  Tolerating well.  Blood pressures at home between 135 and 140 over mid 70s typically.  No side effects.  Omeprazole 20 mg daily for reflux management.  Has had mild cholesterol elevation historically.  BMI 31.5 and goal less than 200 pounds initially.  Did buy a Bowflex as well as some weights that he anticipates utilizing.  Avoiding alcohol during the week.  Anticipate rechecking blood pressure at physical exam within next 6 months.  - Basic metabolic panel  - Basic metabolic panel    Hypercholesterolemia  Mild cholesterol elevation.  Dietary and exercise modification for weight goal less than 200 pounds initially.    Class 1 obesity due to excess calories with serious comorbidity and body mass index (BMI) of 31.0 to 31.9 in adult  Dietary and exercise modification for weight goal less than 200 pounds initially.  Has cut back on alcohol consumption during the week.  Got his Bowflex back out as well as bought some weights that he hopes to utilize on more of a routine basis.    Encounter for immunization  Adacel booster provided.  - TDAP VACCINE (Adacel, Boostrix)          Subjective:    Segundo PILO Wilson is seen today for blood pressure follow-up.  Had been seen March 16, 2023 with blood pressure 140/100 on recheck.  Started losartan hydrochlorothiazide 50/12.5 daily.  Tolerating well.  Blood pressures at home between 135 and 140 over mid 70s typically.  No side effects.  Omeprazole 20 mg daily for reflux management.  Has had mild cholesterol elevation historically.  BMI 31.5 and goal less than 200 pounds initially.  Did buy a Bowflex as well as some weights that he anticipates utilizing.  Avoiding alcohol during the week.  History of nephrolithiasis noted.  Asymptomatic currently.       - [place]   1 daughter - Shaggy  "(8 years old)   Tobacco:  none   EtOH:  occ (weekends now)  Mom -   Dad - type 2 DM   1 older [facility] - Ashok   Surgeries: pyloric stenosis?   Hospitalizations:   H/O kidney stones (Dr. 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[place])   Hobbies:  sports (golf, bowl)        Past Surgical History:   Procedure Laterality Date     COMBINED CYSTOSCOPY, INSERT STENT URETER(S) Left     urethral dilation, ureteroscopy     [place] CYSTO/URETERO W/LITHOTRIPSY &INDWELL STENT INSRT Left 4/30/2019    Procedure: CYSTOSCOPY, LEFT URETEROSCOPY LASER LITHOTRIPSY STENT REMOVAL STENT INSERTION;  Surgeon: Frederick Love MD;  Location: [facility];  Service: Urology     GA ESOPHAGOGASTRIC FUNDOPLASTY      Description: Esophagogastric Fundoplasty Nissen Fundoplication;  Recorded: 03/18/2009;        Family History   Problem Relation Age of Onset     Diabetes Father      Urolithiasis Father         single stone     Clotting Disorder No family hx of      Gout No family hx of      Cancer No family hx of      Heart Disease No family hx of         Past Medical History:   Diagnosis Date     GERD (gastroesophageal reflux disease)      Kidney stones         Social History     Tobacco Use     Smoking status: Never     Smokeless tobacco: Never   Substance Use Topics     Alcohol use: Yes     Comment: Alcoholic Drinks/day: occ social drink     Drug use: No        Current Outpatient Medications   Medication Sig Dispense Refill     losartan-hydrochlorothiazide (HYZAAR) 50-12.5 MG tablet Take 1 tablet by mouth daily 30 tablet 1     omeprazole (PRILOSEC) 20 MG DR capsule Take 20 mg by mouth daily            Objective:    Vitals:    04/28/23 1037 04/28/23 1039   BP: (!) 120/90 110/88   Pulse: 84    Resp: 15    Temp: 98.1  F (36.7  C)    SpO2: 98%    Weight: 96.2 kg (212 lb)    Height: 1.746 m (5' 8.75\")       Body mass index is 31.54 kg/m .    Alert.  No apparent distress blood pressure 132/86 on recheck today.  Cardiac exam regular.  Extremities " warm and dry.  BMI 31.54.      This note has been dictated using voice recognition software and as a result may contain minor grammatical errors and unintended word substitutions.           Answers for HPI/ROS submitted by the patient on 4/28/2023  Do you check your blood pressure regularly outside of the clinic?: Yes  Are your blood pressures ever more than 140 on the top number (systolic) OR more than 90 on the bottom number (diastolic)? (For example, greater than 140/90): No  Are you following a low salt diet?: No  How many servings of fruits and vegetables do you eat daily?: 2-3  On average, how many sweetened beverages do you drink each day (Examples: soda, juice, sweet tea, etc.  Do NOT count diet or artificially sweetened beverages)?: 2  How many minutes a day do you exercise enough to make your heart beat faster?: 10 to 19  How many days a week do you exercise enough to make your heart beat faster?: 3 or less  How many days per week do you miss taking your medication?: 0

## 2023-04-29 LAB
ANION GAP SERPL CALCULATED.3IONS-SCNC: 12 MMOL/L (ref 7–15)
BUN SERPL-MCNC: 15.1 MG/DL (ref 6–20)
CALCIUM SERPL-MCNC: 9.5 MG/DL (ref 8.6–10)
CHLORIDE SERPL-SCNC: 101 MMOL/L (ref 98–107)
CREAT SERPL-MCNC: 1.17 MG/DL (ref 0.67–1.17)
DEPRECATED HCO3 PLAS-SCNC: 23 MMOL/L (ref 22–29)
GFR SERPL CREATININE-BSD FRML MDRD: 82 ML/MIN/1.73M2
GLUCOSE SERPL-MCNC: 109 MG/DL (ref 70–99)
POTASSIUM SERPL-SCNC: 4.2 MMOL/L (ref 3.4–5.3)
SODIUM SERPL-SCNC: 136 MMOL/L (ref 136–145)

## 2023-05-10 DIAGNOSIS — I10 ESSENTIAL HYPERTENSION: ICD-10-CM

## 2023-05-10 RX ORDER — LOSARTAN POTASSIUM AND HYDROCHLOROTHIAZIDE 12.5; 5 MG/1; MG/1
TABLET ORAL
Qty: 90 TABLET | Refills: 3 | Status: SHIPPED | OUTPATIENT
Start: 2023-05-10 | End: 2024-05-02

## 2023-05-10 NOTE — TELEPHONE ENCOUNTER
"Last Written Prescription Date:  3/16/23  Last Fill Quantity: 30,  # refills: 1   Last office visit provider:  4/28/23     Requested Prescriptions   Pending Prescriptions Disp Refills     losartan-hydrochlorothiazide (HYZAAR) 50-12.5 MG tablet [Pharmacy Med Name: LOSARTAN-HCTZ 50-12.5 MG TAB] 30 tablet 1     Sig: TAKE 1 TABLET BY MOUTH EVERY DAY       Angiotensin-II Receptors Passed - 5/10/2023  3:27 PM        Passed - Last blood pressure under 140/90 in past 12 months     BP Readings from Last 3 Encounters:   04/28/23 110/88   03/16/23 (!) 140/100   03/14/23 (!) 154/104                 Passed - Recent (12 mo) or future (30 days) visit within the authorizing provider's specialty     Patient has had an office visit with the authorizing provider or a provider within the authorizing providers department within the previous 12 mos or has a future within next 30 days. See \"Patient Info\" tab in inbasket, or \"Choose Columns\" in Meds & Orders section of the refill encounter.              Passed - Medication is active on med list        Passed - Patient is age 18 or older        Passed - Normal serum creatinine on file in past 12 months     Recent Labs   Lab Test 04/28/23  1115   CR 1.17       Ok to refill medication if creatinine is low          Passed - Normal serum potassium on file in past 12 months     Recent Labs   Lab Test 04/28/23  1115   POTASSIUM 4.2                   Diuretics (Including Combos) Protocol Passed - 5/10/2023  3:27 PM        Passed - Blood pressure under 140/90 in past 12 months     BP Readings from Last 3 Encounters:   04/28/23 110/88   03/16/23 (!) 140/100   03/14/23 (!) 154/104                 Passed - Recent (12 mo) or future (30 days) visit within the authorizing provider's specialty     Patient has had an office visit with the authorizing provider or a provider within the authorizing providers department within the previous 12 mos or has a future within next 30 days. See \"Patient Info\" tab in " "inbasket, or \"Choose Columns\" in Meds & Orders section of the refill encounter.              Passed - Medication is active on med list        Passed - Patient is age 18 or older        Passed - Normal serum creatinine on file in past 12 months     Recent Labs   Lab Test 04/28/23  1115   CR 1.17              Passed - Normal serum potassium on file in past 12 months     Recent Labs   Lab Test 04/28/23  1115   POTASSIUM 4.2                    Passed - Normal serum sodium on file in past 12 months     Recent Labs   Lab Test 04/28/23  1115                      Joaquin Phillip RN 05/10/23 3:27 PM  "

## 2023-08-05 ENCOUNTER — HEALTH MAINTENANCE LETTER (OUTPATIENT)
Age: 38
End: 2023-08-05

## 2023-11-16 PROBLEM — E78.5 DYSLIPIDEMIA: Status: ACTIVE | Noted: 2023-11-16

## 2023-11-23 ASSESSMENT — ENCOUNTER SYMPTOMS
COUGH: 0
ARTHRALGIAS: 0
JOINT SWELLING: 0
WEAKNESS: 0
SORE THROAT: 0
CONSTIPATION: 0
NERVOUS/ANXIOUS: 0
CHILLS: 0
MYALGIAS: 0
DYSURIA: 0
EYE PAIN: 0
PALPITATIONS: 0
DIZZINESS: 0
PARESTHESIAS: 0
HEMATURIA: 0
FEVER: 0
FREQUENCY: 0
HEMATOCHEZIA: 0
ABDOMINAL PAIN: 0
NAUSEA: 0
HEADACHES: 0
SHORTNESS OF BREATH: 0
DIARRHEA: 0
HEARTBURN: 0

## 2023-11-24 ENCOUNTER — OFFICE VISIT (OUTPATIENT)
Dept: FAMILY MEDICINE | Facility: CLINIC | Age: 38
End: 2023-11-24
Payer: COMMERCIAL

## 2023-11-24 VITALS
HEIGHT: 69 IN | WEIGHT: 217 LBS | OXYGEN SATURATION: 98 % | BODY MASS INDEX: 32.14 KG/M2 | RESPIRATION RATE: 18 BRPM | HEART RATE: 82 BPM | DIASTOLIC BLOOD PRESSURE: 78 MMHG | TEMPERATURE: 98.6 F | SYSTOLIC BLOOD PRESSURE: 128 MMHG

## 2023-11-24 DIAGNOSIS — E66.811 CLASS 1 OBESITY WITH SERIOUS COMORBIDITY AND BODY MASS INDEX (BMI) OF 32.0 TO 32.9 IN ADULT, UNSPECIFIED OBESITY TYPE: ICD-10-CM

## 2023-11-24 DIAGNOSIS — K42.0 UMBILICAL HERNIA WITH OBSTRUCTION: ICD-10-CM

## 2023-11-24 DIAGNOSIS — I10 ESSENTIAL HYPERTENSION: ICD-10-CM

## 2023-11-24 DIAGNOSIS — E78.00 HYPERCHOLESTEROLEMIA: ICD-10-CM

## 2023-11-24 DIAGNOSIS — Z00.00 ROUTINE GENERAL MEDICAL EXAMINATION AT A HEALTH CARE FACILITY: Primary | ICD-10-CM

## 2023-11-24 DIAGNOSIS — Z98.890 HISTORY OF NISSEN FUNDOPLICATION: ICD-10-CM

## 2023-11-24 DIAGNOSIS — K21.9 GASTROESOPHAGEAL REFLUX DISEASE WITHOUT ESOPHAGITIS: ICD-10-CM

## 2023-11-24 DIAGNOSIS — Z87.442 HISTORY OF NEPHROLITHIASIS: ICD-10-CM

## 2023-11-24 LAB
ANION GAP SERPL CALCULATED.3IONS-SCNC: 9 MMOL/L (ref 7–15)
BUN SERPL-MCNC: 11.9 MG/DL (ref 6–20)
CALCIUM SERPL-MCNC: 9.4 MG/DL (ref 8.6–10)
CHLORIDE SERPL-SCNC: 101 MMOL/L (ref 98–107)
CHOLEST SERPL-MCNC: 201 MG/DL
CREAT SERPL-MCNC: 1.23 MG/DL (ref 0.67–1.17)
DEPRECATED HCO3 PLAS-SCNC: 28 MMOL/L (ref 22–29)
EGFRCR SERPLBLD CKD-EPI 2021: 77 ML/MIN/1.73M2
GLUCOSE SERPL-MCNC: 118 MG/DL (ref 70–99)
HDLC SERPL-MCNC: 48 MG/DL
HOLD SPECIMEN: NORMAL
LDLC SERPL CALC-MCNC: 118 MG/DL
NONHDLC SERPL-MCNC: 153 MG/DL
POTASSIUM SERPL-SCNC: 4.4 MMOL/L (ref 3.4–5.3)
SODIUM SERPL-SCNC: 138 MMOL/L (ref 135–145)
TRIGL SERPL-MCNC: 177 MG/DL

## 2023-11-24 PROCEDURE — 80061 LIPID PANEL: CPT | Performed by: FAMILY MEDICINE

## 2023-11-24 PROCEDURE — 99395 PREV VISIT EST AGE 18-39: CPT | Performed by: FAMILY MEDICINE

## 2023-11-24 PROCEDURE — 80048 BASIC METABOLIC PNL TOTAL CA: CPT | Performed by: FAMILY MEDICINE

## 2023-11-24 PROCEDURE — 36415 COLL VENOUS BLD VENIPUNCTURE: CPT | Performed by: FAMILY MEDICINE

## 2023-11-24 ASSESSMENT — PAIN SCALES - GENERAL: PAINLEVEL: NO PAIN (0)

## 2023-11-24 NOTE — PROGRESS NOTES
"Segundo Wilson  /78   Pulse 82   Temp 98.6  F (37  C)   Resp 18   Ht 1.746 m (5' 8.75\")   Wt 98.4 kg (217 lb)   SpO2 98%   BMI 32.28 kg/m       Assessment/Plan:                Segundo was seen today for recheck medication and physical.    Diagnoses and all orders for this visit:    Routine general medical examination at a health care facility    Essential hypertension  -     Basic metabolic panel  -     Extra Tube; Future  -     Extra Tube    Hypercholesterolemia  -     Lipid panel reflex to direct LDL Fasting  -     Extra Tube; Future  -     Extra Tube    Class 1 obesity with serious comorbidity and body mass index (BMI) of 32.0 to 32.9 in adult, unspecified obesity type    Gastroesophageal reflux disease without esophagitis    History of Nissen fundoplication    Umbilical hernia with obstruction    History of nephrolithiasis         DISCUSSION  obtain labs as above. See additional discussion below. Continue current medications.  Subjective:     HPI:    Segundo Wilson is a 38 year old male is here today for physical. He reports having a Nissen fundoplication at a very young age and has been on some type of medication to prevent acid reflux currently omeprazole. Reports no symptom concerns currently. He has dyslipidemia without any history of vascular disease. He has hypertension is on two medications for control, readings taken today is ideal. Readings reported from home or ideal. He is due for some lab test monitoring. He does have an umbilical hernia. It does cause discomfort at times. He is seen a surgeon for before is aware of considerations related to having the hernia fixed. He is a history of kidney stones without any current concern.    Overall today we reviewed his health and health history. We discussed exercise nutrition and weight loss. We discussed continued monitoring of blood pressure and cholesterol along with continuing his current medication treatment.    No indication for any cancer " screening at this time.    We discussed immunizations he declines COVID and flu vaccines currently.    ROS:  Complete review of systems is obtained.  Other than the specific considerations noted above complete review of systems is negative.    Objective:   Medications:  Current Outpatient Medications   Medication    losartan-hydrochlorothiazide (HYZAAR) 50-12.5 MG tablet    omeprazole (PRILOSEC) 20 MG DR capsule     No current facility-administered medications for this visit.        Allergies:   No Known Allergies     Social History     Socioeconomic History    Marital status:      Spouse name: Not on file    Number of children: Not on file    Years of education: Not on file    Highest education level: Not on file   Occupational History    Not on file   Tobacco Use    Smoking status: Never    Smokeless tobacco: Never   Vaping Use    Vaping Use: Never used   Substance and Sexual Activity    Alcohol use: Yes     Comment: Alcoholic Drinks/day: occ social drink    Drug use: No    Sexual activity: Yes     Partners: Female     Birth control/protection: None   Other Topics Concern    Not on file   Social History Narrative    Not on file     Social Determinants of Health     Financial Resource Strain: Low Risk  (11/23/2023)    Financial Resource Strain     Within the past 12 months, have you or your family members you live with been unable to get utilities (heat, electricity) when it was really needed?: No   Food Insecurity: Low Risk  (11/23/2023)    Food Insecurity     Within the past 12 months, did you worry that your food would run out before you got money to buy more?: No     Within the past 12 months, did the food you bought just not last and you didn t have money to get more?: No   Transportation Needs: Low Risk  (11/23/2023)    Transportation Needs     Within the past 12 months, has lack of transportation kept you from medical appointments, getting your medicines, non-medical meetings or appointments, work, or  "from getting things that you need?: No   Physical Activity: Not on file   Stress: Not on file   Social Connections: Not on file   Interpersonal Safety: Low Risk  (11/24/2023)    Interpersonal Safety     Do you feel physically and emotionally safe where you currently live?: Yes     Within the past 12 months, have you been hit, slapped, kicked or otherwise physically hurt by someone?: No     Within the past 12 months, have you been humiliated or emotionally abused in other ways by your partner or ex-partner?: No   Housing Stability: Low Risk  (11/23/2023)    Housing Stability     Do you have housing? : Yes     Are you worried about losing your housing?: No       Family History   Problem Relation Age of Onset    Diabetes Father     Urolithiasis Father         single stone    Clotting Disorder No family hx of     Gout No family hx of     Cancer No family hx of     Heart Disease No family hx of         Most Recent Immunizations   Administered Date(s) Administered    COVID-19 MONOVALENT 12+ (Pfizer) 07/09/2021    Hepatitis B, Peds 08/23/2000    Historical DTP/aP 10/03/1990    MMR 07/02/1997    OPV, trivalent, live 10/03/1990    TDAP (Adacel,Boostrix) 04/28/2023    Td (Adult), Adsorbed 07/02/1997    Varicella 08/25/1997        Wt Readings from Last 3 Encounters:   11/24/23 98.4 kg (217 lb)   04/28/23 96.2 kg (212 lb)   03/16/23 96.2 kg (212 lb)        BP Readings from Last 6 Encounters:   11/24/23 128/78   04/28/23 110/88   03/16/23 (!) 140/100   03/14/23 (!) 154/104   05/21/20 (!) 160/102        Hemoglobin A1C   Date Value Ref Range Status   06/13/2013 5.3 3.5 - 6.0 % Final        PHYSICAL EXAM:    /78   Pulse 82   Temp 98.6  F (37  C)   Resp 18   Ht 1.746 m (5' 8.75\")   Wt 98.4 kg (217 lb)   SpO2 98%   BMI 32.28 kg/m           General Appearance:    Alert, cooperative, no distress   Eyes:   No scleral icterus or conjunctival irritation       Ears:    Normal TM's and external ear canals, both ears   Throat:   " Lips, mucosa, and tongue normal; teeth and gums normal   Neck:   Supple, symmetrical, trachea midline, no adenopathy;        thyroid:  No enlargement/tenderness/nodules   Lungs:     Clear to auscultation bilaterally, respirations unlabored, no wheezes or crackles   Heart:    Regular rate and rhythm,  No murmur   Abdomen:    Soft, no distention, no tenderness on palpation, no masses, no organomegaly, there is a small umbilical hernia with only mild tenderness easily reducible. Other surgical scars are present well healed without signs of ventral hernia.     Extremities:  No edema, no joint swelling or redness, no evidence of any injuries   Skin:  No concerning skin findings, no suspicious moles, no rashes   Neurologic:  On gross examination there is no motor or sensory deficit.  Patient walks with a normal gait                         Answers submitted by the patient for this visit:  Annual Preventive Visit (Submitted on 11/23/2023)  Chief Complaint: Annual Exam:  Frequency of exercise:: 2-3 days/week  Getting at least 3 servings of Calcium per day:: Yes  Diet:: Regular (no restrictions)  Taking medications regularly:: Yes  Medication side effects:: None  Bi-annual eye exam:: NO  Dental care twice a year:: NO  Sleep apnea or symptoms of sleep apnea:: Daytime drowsiness  abdominal pain: No  Blood in stool: No  Blood in urine: No  chest pain: No  chills: No  congestion: No  constipation: No  cough: No  diarrhea: No  dizziness: No  ear pain: No  eye pain: No  nervous/anxious: No  fever: No  frequency: No  genital sores: No  headaches: No  hearing loss: No  heartburn: No  arthralgias: No  joint swelling: No  peripheral edema: No  mood changes: No  myalgias: No  nausea: No  dysuria: No  palpitations: No  Skin sensation changes: No  sore throat: No  urgency: No  rash: No  shortness of breath: No  visual disturbance: No  weakness: No  impotence: No  penile discharge: No  Additional concerns today:: No  Exercise outside of  work (Submitted on 11/23/2023)  Chief Complaint: Annual Exam:  Duration of exercise:: 15-30 minutes

## 2024-04-03 ENCOUNTER — E-VISIT (OUTPATIENT)
Dept: FAMILY MEDICINE | Facility: CLINIC | Age: 39
End: 2024-04-03
Payer: COMMERCIAL

## 2024-04-03 DIAGNOSIS — L71.9 ACNE ROSACEA: Primary | ICD-10-CM

## 2024-04-03 PROCEDURE — 99421 OL DIG E/M SVC 5-10 MIN: CPT | Performed by: FAMILY MEDICINE

## 2024-04-03 RX ORDER — METRONIDAZOLE 10 MG/G
GEL TOPICAL DAILY
Qty: 60 G | Refills: 0 | Status: SHIPPED | OUTPATIENT
Start: 2024-04-03

## 2024-04-03 NOTE — PATIENT INSTRUCTIONS
Thank you for choosing us for your care. I have placed an order for a prescription so that you can start treatment. View your full visit summary for details by clicking on the link below. Your pharmacist will able to address any questions you may have about the medication.     If you're not feeling better within 5-7 days, please schedule an appointment.  You can schedule an appointment right here in [facility], or call [phone]  If the visit is for the same symptoms as your eVisit, we'll refund the cost of your eVisit if seen within seven days.

## 2024-05-02 DIAGNOSIS — I10 ESSENTIAL HYPERTENSION: ICD-10-CM

## 2024-05-02 RX ORDER — LOSARTAN POTASSIUM AND HYDROCHLOROTHIAZIDE 12.5; 5 MG/1; MG/1
TABLET ORAL
Qty: 90 TABLET | Refills: 1 | Status: SHIPPED | OUTPATIENT
Start: 2024-05-02

## 2024-10-25 ENCOUNTER — PATIENT OUTREACH (OUTPATIENT)
Dept: CARE COORDINATION | Facility: CLINIC | Age: 39
End: 2024-10-25
Payer: COMMERCIAL

## 2024-10-27 DIAGNOSIS — I10 ESSENTIAL HYPERTENSION: ICD-10-CM

## 2024-10-28 RX ORDER — LOSARTAN POTASSIUM AND HYDROCHLOROTHIAZIDE 12.5; 5 MG/1; MG/1
TABLET ORAL
Qty: 90 TABLET | Refills: 1 | Status: SHIPPED | OUTPATIENT
Start: 2024-10-28

## 2024-11-08 ENCOUNTER — PATIENT OUTREACH (OUTPATIENT)
Dept: CARE COORDINATION | Facility: CLINIC | Age: 39
End: 2024-11-08
Payer: COMMERCIAL

## 2024-12-03 ENCOUNTER — OFFICE VISIT (OUTPATIENT)
Dept: URGENT CARE | Facility: URGENT CARE | Age: 39
End: 2024-12-03
Payer: COMMERCIAL

## 2024-12-03 VITALS
DIASTOLIC BLOOD PRESSURE: 82 MMHG | SYSTOLIC BLOOD PRESSURE: 134 MMHG | OXYGEN SATURATION: 96 % | HEART RATE: 100 BPM | TEMPERATURE: 99.1 F | RESPIRATION RATE: 14 BRPM

## 2024-12-03 DIAGNOSIS — J02.0 STREP THROAT: Primary | ICD-10-CM

## 2024-12-03 DIAGNOSIS — J02.9 SORE THROAT: ICD-10-CM

## 2024-12-03 LAB — DEPRECATED S PYO AG THROAT QL EIA: POSITIVE

## 2024-12-03 PROCEDURE — 99213 OFFICE O/P EST LOW 20 MIN: CPT | Performed by: PHYSICIAN ASSISTANT

## 2024-12-03 PROCEDURE — 87880 STREP A ASSAY W/OPTIC: CPT

## 2024-12-03 RX ORDER — PENICILLIN V POTASSIUM 500 MG/1
500 TABLET, FILM COATED ORAL 2 TIMES DAILY
Qty: 20 TABLET | Refills: 0 | Status: SHIPPED | OUTPATIENT
Start: 2024-12-03 | End: 2024-12-13

## 2024-12-03 NOTE — PROGRESS NOTES
Patient presents with:  Throat Problem: Has had sore throat and fever  since yesterday up to 102 has taken Dayquil      Clinical Decision Making:f  Strep test was obtained and was positive.  Symptomatic care was gone over. Expected course of resolution and indication for return was gone over and questions were answered to patient/parent's satisfaction before discharge.        ICD-10-CM    1. Strep throat  J02.0 penicillin V (VEETID) 500 MG tablet      2. Sore throat  J02.9 Streptococcus A Rapid Screen w/Reflex to PCR - Clinic Collect          Patient Instructions   Suggested increased rest increased fluids and bedside humidification  Over-the-counter Tylenol for comfort.  Follow packaging directions  Over-the-counter throat lozenges with benzocaine, such as Cepacol, may be used if indicated and is not a choking hazard based on age.    Follow packaging directions.    Do not overuse the benzocaine as it will dry the throat and make it uncomfortable.  May use the throat lozenges one lozenge per hour.  May use hard candies or lemon drops etc. to keep the saliva flowing for comfort until the next hour interval dosing for the lozenge  Noncontagious after 24 hours on the antibiotic.  Change toothbrush out after 48 hours to avoid reinfecting the mouth.  Follow-up after completion of the antibiotic if not completely resolved with symptoms or new symptoms or concerns arise.      HPI:  Segundo Wilson is a 39 year old male who presents today one day acute onset of sore throat and odynophagia fever with temperature max of 102 degrees.  Patient denies chills, night sweats, fatigue, vomiting, diarrhea, skin rash, abdominal pain or urinary symptoms.      No known sick contacts for strep throat.    Has tried over-the-counter DayQuil for treatment for this at home.     History obtained from chart review and the patient.    Problem List:  2023-11: Dyslipidemia  2020-06: Umbilical hernia without obstruction and without  gangrene  2020-05: Depressed mood  2018-07: Calculus of ureter  2018-07: Hydronephrosis with urinary obstruction due to ureteral   calculus  2018-07: Calculus of kidney  2015-01: Abdominal pain  Dyslipidemia  Other stricture of bulbous urethra in male  Sacroiliitis      Past Medical History:   Diagnosis Date    GERD (gastroesophageal reflux disease)     Kidney stones        Social History     Tobacco Use    Smoking status: Never    Smokeless tobacco: Never   Substance Use Topics    Alcohol use: Yes     Comment: Alcoholic Drinks/day: occ social drink       Review of Systems  As above in HPI otherwise negative.    Vitals:    12/03/24 1022   BP: 134/82   Pulse: 100   Resp: 14   Temp: 99.1  F (37.3  C)   TempSrc: Oral   SpO2: 96%       General: Patient is resting comfortably no acute distress is afebrile  HEENT: Head is normocephalic atraumatic   eyes are PERRL EOMI sclera anicteric   TMs are clear bilaterally  Throat is with pharyngeal wall erythema and no exudate  No cervical lymphadenopathy present  LUNGS: Clear to auscultation bilaterally  HEART: Regular rate and rhythm  Skin: Without rash non-diaphoretic    Physical Exam      Labs:  Results for orders placed or performed in visit on 12/03/24   Streptococcus A Rapid Screen w/Reflex to PCR - Clinic Collect     Status: Abnormal    Specimen: Throat; Swab   Result Value Ref Range    Group A Strep antigen Positive (A) Negative       At the end of the encounter, I discussed results, diagnosis, medications. Discussed red flags for immediate return to clinic/ER, as well as indications for follow up if no improvement. Patient understood and agreed to plan. Patient was stable for discharge.

## 2024-12-03 NOTE — LETTER
December 3, 2024      Segundo Wilson  [address] [place] [address]        To Whom It May Concern:    Segundo Wilson  was seen on 12/03/24.  Please excuse him  until 12/03/24  due to illness.        Sincerely,        Noah Valle PA-C

## 2024-12-03 NOTE — PATIENT INSTRUCTIONS
Suggested increased rest increased fluids and bedside humidification  Over-the-counter Tylenol for comfort.  Follow packaging directions  Over-the-counter throat lozenges with benzocaine, such as Cepacol, may be used if indicated and is not a choking hazard based on age.    Follow packaging directions.    Do not overuse the benzocaine as it will dry the throat and make it uncomfortable.  May use the throat lozenges one lozenge per hour.  May use hard candies or lemon drops etc. to keep the saliva flowing for comfort until the next hour interval dosing for the lozenge  Noncontagious after 24 hours on the antibiotic.  Change toothbrush out after 48 hours to avoid reinfecting the mouth.  Follow-up after completion of the antibiotic if not completely resolved with symptoms or new symptoms or concerns arise.

## 2025-01-18 ENCOUNTER — HEALTH MAINTENANCE LETTER (OUTPATIENT)
Age: 40
End: 2025-01-18

## 2025-02-27 ENCOUNTER — OFFICE VISIT (OUTPATIENT)
Dept: FAMILY MEDICINE | Facility: CLINIC | Age: 40
End: 2025-02-27
Payer: COMMERCIAL

## 2025-02-27 VITALS
RESPIRATION RATE: 13 BRPM | HEIGHT: 69 IN | WEIGHT: 221 LBS | OXYGEN SATURATION: 97 % | SYSTOLIC BLOOD PRESSURE: 122 MMHG | DIASTOLIC BLOOD PRESSURE: 86 MMHG | BODY MASS INDEX: 32.73 KG/M2 | HEART RATE: 90 BPM | TEMPERATURE: 98.1 F

## 2025-02-27 DIAGNOSIS — E66.09 CLASS 1 OBESITY DUE TO EXCESS CALORIES WITH SERIOUS COMORBIDITY AND BODY MASS INDEX (BMI) OF 31.0 TO 31.9 IN ADULT: ICD-10-CM

## 2025-02-27 DIAGNOSIS — E78.00 HYPERCHOLESTEROLEMIA: ICD-10-CM

## 2025-02-27 DIAGNOSIS — L71.9 ACNE ROSACEA: ICD-10-CM

## 2025-02-27 DIAGNOSIS — E66.811 CLASS 1 OBESITY DUE TO EXCESS CALORIES WITH SERIOUS COMORBIDITY AND BODY MASS INDEX (BMI) OF 31.0 TO 31.9 IN ADULT: ICD-10-CM

## 2025-02-27 DIAGNOSIS — I10 ESSENTIAL HYPERTENSION: Primary | ICD-10-CM

## 2025-02-27 DIAGNOSIS — R73.01 IMPAIRED FASTING GLUCOSE: ICD-10-CM

## 2025-02-27 DIAGNOSIS — K42.9 UMBILICAL HERNIA WITHOUT OBSTRUCTION AND WITHOUT GANGRENE: ICD-10-CM

## 2025-02-27 LAB
ANION GAP SERPL CALCULATED.3IONS-SCNC: 12 MMOL/L (ref 7–15)
BUN SERPL-MCNC: 15 MG/DL (ref 6–20)
CALCIUM SERPL-MCNC: 10.4 MG/DL (ref 8.8–10.4)
CHLORIDE SERPL-SCNC: 100 MMOL/L (ref 98–107)
CHOLEST SERPL-MCNC: 221 MG/DL
CREAT SERPL-MCNC: 1.31 MG/DL (ref 0.67–1.17)
EGFRCR SERPLBLD CKD-EPI 2021: 71 ML/MIN/1.73M2
ERYTHROCYTE [DISTWIDTH] IN BLOOD BY AUTOMATED COUNT: 11.9 % (ref 10–15)
EST. AVERAGE GLUCOSE BLD GHB EST-MCNC: 108 MG/DL
FASTING STATUS PATIENT QL REPORTED: ABNORMAL
FASTING STATUS PATIENT QL REPORTED: ABNORMAL
GLUCOSE SERPL-MCNC: 108 MG/DL (ref 70–99)
HBA1C MFR BLD: 5.4 % (ref 0–5.6)
HCO3 SERPL-SCNC: 26 MMOL/L (ref 22–29)
HCT VFR BLD AUTO: 47.2 % (ref 40–53)
HDLC SERPL-MCNC: 52 MG/DL
HGB BLD-MCNC: 17.2 G/DL (ref 13.3–17.7)
LDLC SERPL CALC-MCNC: 135 MG/DL
MCH RBC QN AUTO: 33.8 PG (ref 26.5–33)
MCHC RBC AUTO-ENTMCNC: 36.4 G/DL (ref 31.5–36.5)
MCV RBC AUTO: 93 FL (ref 78–100)
NONHDLC SERPL-MCNC: 169 MG/DL
PLATELET # BLD AUTO: 178 10E3/UL (ref 150–450)
POTASSIUM SERPL-SCNC: 4.4 MMOL/L (ref 3.4–5.3)
RBC # BLD AUTO: 5.09 10E6/UL (ref 4.4–5.9)
SODIUM SERPL-SCNC: 138 MMOL/L (ref 135–145)
TRIGL SERPL-MCNC: 170 MG/DL
WBC # BLD AUTO: 6.2 10E3/UL (ref 4–11)

## 2025-02-27 RX ORDER — LOSARTAN POTASSIUM AND HYDROCHLOROTHIAZIDE 12.5; 5 MG/1; MG/1
1 TABLET ORAL DAILY
Qty: 90 TABLET | Refills: 1 | Status: CANCELLED | OUTPATIENT
Start: 2025-02-27

## 2025-02-27 RX ORDER — LOSARTAN POTASSIUM AND HYDROCHLOROTHIAZIDE 12.5; 1 MG/1; MG/1
1 TABLET ORAL DAILY
Qty: 90 TABLET | Refills: 1 | Status: SHIPPED | OUTPATIENT
Start: 2025-02-27

## 2025-02-27 ASSESSMENT — PAIN SCALES - GENERAL: PAINLEVEL_OUTOF10: NO PAIN (0)

## 2025-02-27 NOTE — PROGRESS NOTES
Assessment/Plan:    Essential hypertension  Hypertension fair control blood pressure 122/86 and will increase losartan hydrochlorothiazide from 50/12.5 instead up to 100/12.5 daily.  Medication monitoring completed today.  - losartan-hydrochlorothiazide (HYZAAR) 100-12.5 MG tablet  Dispense: 90 tablet; Refill: 1    Umbilical hernia without obstruction and without gangrene  Umbilical hernia reviewed.  Reducible.  Had seen Dr. Addison Hu back in 2020.  Will update CBC.  Patient will await recommendations regarding scheduling definitive hernia repair.  - CBC with platelets    Hypercholesterolemia  Lipid cascade updated today while fasting.  Weight goal remains less than 210 pounds initially, less than 200 pounds ideally.  - Lipid panel reflex to direct LDL Fasting    Class 1 obesity due to excess calories with serious comorbidity and body mass index (BMI) of 31.0 to 31.9 in adult  Dietary and exercise modifications.  Weight goal less than 210 pounds initially, less than 200 pounds ideally.    Impaired fasting glucose  A1c and fasting glucose.  - Adult Gen Surg  Referral  - Basic metabolic panel  - Hemoglobin A1c    Acne rosacea  Show redness described.  No improvement with trial of metronidazole or OTC products.  Dermatology referral placed.    Obesity BMI 33.11  Dietary and exercise modification for weight goal less than 210 pounds initially, less than 200 pounds ideally.           Subjective:    Segundo Wilson is seen today for follow-up assessment.  Hypertension.  He is on losartan hydrochlorothiazide 50/12.5 daily.  Blood pressures typically around 130/80 outside of office.  Umbilical hernia previously.  Seeing Dr. Addison Hu but because of pandemic did not have umbilical hernia repair.  Has had elevated cholesterol historically.  BMI noted 33.11.  Impaired fasting glucose as well with prior fasting glucose 118 November 24, 2023.  Facial redness.  Concern for potential rosacea etiology but no  response to metronidazole or OTC products and would be interested in seeing dermatologist.  Comprehensive review of systems as above otherwise all negative.       - [place]   1 daughter - Shaggy (9 years old)   1 son - Ash (age 7)   Tobacco:  none   EtOH:  occ (weekends now)   Mom -   Dad - type 2 DM   1 older bro - Ashok   Surgeries: pyloric stenosis?   Hospitalizations:   H/O kidney stones (Dr. 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[employer])   Hobbies:  sports (golf, bowl)       Past Surgical History:   Procedure Laterality Date    COMBINED CYSTOSCOPY, INSERT STENT URETER(S) Left     urethral dilation, ureteroscopy    OR CYSTO/URETERO W/LITHOTRIPSY &INDWELL STENT INSRT Left 4/30/2019    Procedure: CYSTOSCOPY, LEFT URETEROSCOPY LASER LITHOTRIPSY STENT REMOVAL STENT INSERTION;  Surgeon: Frederick Love MD;  Location: [facility];  Service: Urology    OR ESOPHAGOGASTRIC FUNDOPLASTY      Description: Esophagogastric Fundoplasty Nissen Fundoplication;  Recorded: 03/18/2009;        Family History   Problem Relation Age of Onset    Diabetes Father     Urolithiasis Father         single stone    Clotting Disorder No family hx of     Gout No family hx of     Cancer No family hx of     Heart Disease No family hx of         Past Medical History:   Diagnosis Date    GERD (gastroesophageal reflux disease)     Kidney stones         Social History     Tobacco Use    Smoking status: Never    Smokeless tobacco: Never   Vaping Use    Vaping status: Never Used   Substance Use Topics    Alcohol use: Yes     Comment: Alcoholic Drinks/day: [facility] social drink    Drug use: No        Current Outpatient Medications   Medication Sig Dispense Refill    losartan-hydrochlorothiazide (HYZAAR) 100-12.5 MG tablet Take 1 tablet by mouth daily. 90 tablet 1    losartan-hydrochlorothiazide (HYZAAR) 50-12.5 MG tablet TAKE 1 TABLET BY MOUTH EVERY DAY 90 tablet 1    omeprazole (PRILOSEC) 20 MG DR capsule Take 20 mg by mouth daily       "metroNIDAZOLE (METROGEL) 1 % external gel Apply topically daily (Patient not taking: Reported on 2/27/2025) 60 g 0          Objective:    Vitals:    02/27/25 1230 02/27/25 1234 02/27/25 1312   BP: 130/90 130/80 122/86   Pulse: 90     Resp: 13     Temp: 98.1  F (36.7  C)     SpO2: 97%     Weight: 100.2 kg (221 lb)     Height: 1.74 m (5' 8.5\")        Body mass index is 33.11 kg/m .    Alert.  No apparent distress.  Facial redness of her cheeks predominantly more so than bridge of nose.  Mild telangiectasias likely.  Blood pressure and recheck 122/86.  BMI 33.11.      This note has been dictated using voice recognition software and as a result may contain minor grammatical errors and unintended word substitutions.           Answers submitted by the patient for this visit:  General Questionnaire (Submitted on 2/26/2025)  Chief Complaint: Chronic problems general questions HPI Form  What is the reason for your visit today? : Med check  How many days per week do you miss taking your medication?: 0  Questionnaire about: Chronic problems general questions HPI Form (Submitted on 2/26/2025)  Chief Complaint: Chronic problems general questions HPI Form    "

## 2025-03-07 ENCOUNTER — OFFICE VISIT (OUTPATIENT)
Dept: SURGERY | Facility: CLINIC | Age: 40
End: 2025-03-07
Attending: FAMILY MEDICINE
Payer: COMMERCIAL

## 2025-03-07 VITALS — DIASTOLIC BLOOD PRESSURE: 84 MMHG | WEIGHT: 223 LBS | BODY MASS INDEX: 33.41 KG/M2 | SYSTOLIC BLOOD PRESSURE: 128 MMHG

## 2025-03-07 DIAGNOSIS — E66.811 CLASS 1 OBESITY DUE TO EXCESS CALORIES WITH SERIOUS COMORBIDITY AND BODY MASS INDEX (BMI) OF 31.0 TO 31.9 IN ADULT: ICD-10-CM

## 2025-03-07 DIAGNOSIS — E66.09 CLASS 1 OBESITY DUE TO EXCESS CALORIES WITH SERIOUS COMORBIDITY AND BODY MASS INDEX (BMI) OF 31.0 TO 31.9 IN ADULT: ICD-10-CM

## 2025-03-07 DIAGNOSIS — K42.9 UMBILICAL HERNIA WITHOUT OBSTRUCTION AND WITHOUT GANGRENE: Primary | ICD-10-CM

## 2025-03-07 PROCEDURE — 3079F DIAST BP 80-89 MM HG: CPT | Performed by: SPECIALIST

## 2025-03-07 PROCEDURE — 3074F SYST BP LT 130 MM HG: CPT | Performed by: SPECIALIST

## 2025-03-07 PROCEDURE — 99203 OFFICE O/P NEW LOW 30 MIN: CPT | Performed by: SPECIALIST

## 2025-03-07 RX ORDER — ACETAMINOPHEN 325 MG/1
975 TABLET ORAL ONCE
OUTPATIENT
Start: 2025-03-07 | End: 2025-03-07

## 2025-03-07 NOTE — PROGRESS NOTES
HPI:  This is a 39 year old male here today with concerns of pain and bulging in his umbilicus area. He has noted this for the past a a few  month. The symptoms have progressed and increased over this time. He comes in for evaluation secondary to the hernia causing enough issues to bother them with daily activities or chores.    Allergies:Patient has no known allergies.    Past Medical History:   Diagnosis Date    GERD (gastroesophageal reflux disease)     Kidney stones        Past Surgical History:   Procedure Laterality Date    COMBINED CYSTOSCOPY, INSERT STENT URETER(S) Left     urethral dilation, ureteroscopy    IN CYSTO/URETERO W/LITHOTRIPSY &INDWELL STENT INSRT Left 4/30/2019    Procedure: CYSTOSCOPY, LEFT URETEROSCOPY LASER LITHOTRIPSY STENT REMOVAL STENT INSERTION;  Surgeon: Frederick Love MD;  Location: [facility];  Service: Urology    IN ESOPHAGOGASTRIC FUNDOPLASTY      Description: Esophagogastric Fundoplasty Nissen Fundoplication;  Recorded: 03/18/2009;       CURRENT MEDS:  No current facility-administered medications for this visit.       Family History   Problem Relation Age of Onset    Diabetes Father     Urolithiasis Father         single stone    Clotting Disorder No family hx of     Gout No family hx of     Cancer No family hx of     Heart Disease No family hx of         reports that he has never smoked. He has never used smokeless tobacco. He reports current alcohol use. He reports that he does not use drugs.    Review of Systems - Negative except umbilical bulge and  pain. Otherwise twelve system of review is negative.      Vitals:    03/07/25 1339   BP: 128/84   BP Location: Right arm   Patient Position: Sitting   Cuff Size: Adult Regular   Weight: 101.2 kg (223 lb)       Body mass index is 33.41 kg/m .    EXAM:  General: NAD   HEENT: normocephalic, PERRLA and EOMS intact  Mounth: Mucus membranes moist  Neck: Supple  Chest: Clear to auscultation bilaterally  CV: RRR  ABD: Soft  nontender and nondistended, umbilical hernia, reducible  EXT: Warm, pulses intact,   Neuro: Alert and oriented x3  Back: no CVA tenderness      Assessment/Plan: Pt with a umbilical hernia. I discussed this at length with He.  I went over conservative management as well as surgical treatment of this.. I would plan on doing this via anopen  approach with possible use of mesh. I went over the small risks of surgery including but not limited to bleeding and infection, anesthesia, recurrence rates and nerve injury. I discussed the expected recovery time as well. Will get this scheduled. He will contact us to have this scheduled.      Addison Hu MD ,MD Addison Hu MD  General Surgery [phone]  Vascular Surgery [phone]

## 2025-03-07 NOTE — LETTER
3/7/2025      Segundo Wilson  [address]  [place] [address]      Dear Colleague,    Thank you for referring your patient, Segundo Wilson, to the [facility] [place]. Please see a copy of my visit note below.          HPI:  This is a 39 year old male here today with concerns of pain and bulging in his umbilicus area. He has noted this for the past a a few  month. The symptoms have progressed and increased over this time. He comes in for evaluation secondary to the hernia causing enough issues to bother them with daily activities or chores.    Allergies:Patient has no known allergies.    Past Medical History:   Diagnosis Date     GERD (gastroesophageal reflux disease)      Kidney stones        Past Surgical History:   Procedure Laterality Date     COMBINED CYSTOSCOPY, INSERT STENT URETER(S) Left     urethral dilation, ureteroscopy     DC CYSTO/URETERO W/LITHOTRIPSY &INDWELL STENT INSRT Left 4/30/2019    Procedure: CYSTOSCOPY, LEFT URETEROSCOPY LASER LITHOTRIPSY STENT REMOVAL STENT INSERTION;  Surgeon: Frederick Love MD;  Location: [facility];  Service: Urology     DC ESOPHAGOGASTRIC FUNDOPLASTY      Description: Esophagogastric Fundoplasty Nissen Fundoplication;  Recorded: 03/18/2009;       CURRENT MEDS:  No current facility-administered medications for this visit.       Family History   Problem Relation Age of Onset     Diabetes Father      Urolithiasis Father         single stone     Clotting Disorder No family hx of      Gout No family hx of      Cancer No family hx of      Heart Disease No family hx of         reports that he has never smoked. He has never used smokeless tobacco. He reports current alcohol use. He reports that he does not use drugs.    Review of Systems - Negative except umbilical bulge and  pain. Otherwise twelve system of review is negative.      Vitals:    03/07/25 1339   BP: 128/84   BP Location: Right arm   Patient Position:  Sitting   Cuff Size: Adult Regular   Weight: 101.2 kg (223 lb)       Body mass index is 33.41 kg/m .    EXAM:  General: NAD   HEENT: normocephalic, PERRLA and EOMS intact  Mounth: Mucus membranes moist  Neck: Supple  Chest: Clear to auscultation bilaterally  CV: RRR  ABD: Soft nontender and nondistended, umbilical hernia, reducible  EXT: Warm, pulses intact,   Neuro: Alert and oriented x3  Back: no CVA tenderness      Assessment/Plan: Pt with a umbilical hernia. I discussed this at length with He.  I went over conservative management as well as surgical treatment of this.. I would plan on doing this via anopen  approach with possible use of mesh. I went over the small risks of surgery including but not limited to bleeding and infection, anesthesia, recurrence rates and nerve injury. I discussed the expected recovery time as well. Will get this scheduled. He will contact us to have this scheduled.      Addison Hu MD ,MD Addison Hu MD  General Surgery [phone]  Vascular Surgery [phone]          Again, thank you for allowing me to participate in the care of your patient.        Sincerely,        Adidson Hu MD    Electronically signed

## 2025-03-11 ENCOUNTER — TELEPHONE (OUTPATIENT)
Dept: SURGERY | Facility: CLINIC | Age: 40
End: 2025-03-11
Payer: COMMERCIAL

## 2025-03-11 NOTE — TELEPHONE ENCOUNTER
I returned call to Segundo and he's ready to schedule surgery but wanted to get a few possible dates that he can bring back to his employer. He stated during our conversation that he will talk with his employer and then call back to finalize a surgery date. I will await his return call.

## 2025-03-12 NOTE — TELEPHONE ENCOUNTER
Patient called me to schedule surgery w/ Dr. Hu. He's scheduled for 04.11.25 at [facility]. We went over details and I let him know I will send a confirmation letter to his MyChart. He's in agreement with the plan.

## 2025-03-19 ENCOUNTER — TELEPHONE (OUTPATIENT)
Dept: SURGERY | Facility: CLINIC | Age: 40
End: 2025-03-19
Payer: COMMERCIAL

## 2025-03-19 NOTE — TELEPHONE ENCOUNTER
Patient dropped off paperwork for Dr. Hu to fill out, per patient's request please fax & call patient once completed.    Paperwork will be in [employer] INCOMING drawer & Gen Surg folder.

## 2025-03-20 ENCOUNTER — DOCUMENTATION ONLY (OUTPATIENT)
Dept: SURGERY | Facility: CLINIC | Age: 40
End: 2025-03-20
Payer: COMMERCIAL

## 2025-03-20 NOTE — PROGRESS NOTES
Short term disability forms received, completed and signed on providers behalf.     Leave start date: 04/11/2025     Leave end date: 05/02/2025    RTW on 05/02/2025.     Forms faxed to [employer] at [phone]. Fax successful.     Forms labeled and sent to HIM for scanning.    Elizabeth RIVAS RN, BSN    [facility]  General Surgery  [address] [place], [place] [id number]  Office: [phone]  Employed by [employer]

## 2025-03-20 NOTE — TELEPHONE ENCOUNTER
Forms filled and faxed. Attempted to call patient to update him. No answer and LVM to call clinic back.    Elizabeth RIVAS   RN, BSN    [facility]  General Surgery  [address] [place] [address] [address]  [place] [address]  Office: [phone]  Employed by [employer]

## 2025-03-21 PROBLEM — I83.813 PAIN DUE TO VARICOSE VEINS OF BOTH LOWER EXTREMITIES: Status: ACTIVE | Noted: 2022-07-29

## 2025-03-28 NOTE — H&P (VIEW-ONLY)
Preoperative Evaluation  [facility]  [address]  [facility] [address]  Phone: [phone]  Fax: [phone]  Primary Provider: Frederick Ray MD  Pre-op Performing Provider: Jose Luis Obrien MD  Mar 28, 2025             3/28/2025   Surgical Information   What procedure is being done? Hernia surgery   Facility or Hospital where procedure/surgery will be performed: [facility]   Who is doing the procedure / surgery? Dr. Hu   Date of surgery / procedure: 4/11/2025   Time of surgery / procedure: 7:00am   Where do you plan to recover after surgery? at home with family     Fax number for surgical facility:     Assessment & Plan     The proposed surgical procedure is considered INTERMEDIATE risk.    Segundo was seen today for pre-op exam.    Diagnoses and all orders for this visit:    Preop general physical exam    Essential hypertension  -     Basic metabolic panel; Future    Umbilical hernia without obstruction and without gangrene    Hypercholesterolemia    Gastroesophageal reflux disease without esophagitis  -     CBC with platelets; Future    History of nephrolithiasis    History of Nissen fundoplication         - No identified additional risk factors other than previously addressed    Antiplatelet or Anticoagulation Medication Instructions   - We reviewed the medication list and the patient is not on an antiplatelet or anticoagulation medications.    Additional Medication Instructions   - ACE/ARB/ARNI (lisinopril, enalapril, losartan, valsartan, olmesartan, sacubritril/valsartan) : DO NOT TAKE on day of surgery (minimum 11 hours for general anesthesia).   - Diuretics (furosemide, hydrochlorothiazide, chlorothalidone): DO NOT TAKE on the day of surgery.    Recommendation  Approval given to proceed with proposed procedure, without further diagnostic evaluation.    Subjective   Segundo is a 39 year old, presenting for the following:  Pre-Op Exam    His medical  history includes hypertension, hyperlipidemia, body mass index of 32.66, and umbilical hernia.  His blood pressure is controlled.  Cholesterol profile confers overall low risk for vascular disease although has mild elevation in cholesterol.  No history of symptoms of vascular disease.  He has a history of kidney stones without any current concern.  He has a history of acid reflux and has undergone a Nissen fundoplication.  Acid reflux symptoms are controlled with use of omeprazole.    He is scheduled to undergo umbilical hernia repair.    No signs or symptoms of an acute illness or infection.  No prior history of any significant personal problems with anesthesia.  No history of a blood clot or bleeding disorder.            3/28/2025     2:36 PM   Additional Questions   Roomed by am cma         3/28/2025   Forms   Any forms needing to be completed Yes           3/28/2025   Pre-Op Questionnaire   Have you ever had a heart attack or stroke? No   Have you ever had surgery on your heart or blood vessels, such as a stent placement, a coronary artery bypass, or surgery on an artery in your head, neck, heart, or legs? No   Do you have chest pain with activity? No   Do you have a history of heart failure? No   Do you currently have a cold, bronchitis or symptoms of other infection? No   Do you have a cough, shortness of breath, or wheezing? No   Do you or anyone in your family have previous history of blood clots? No   Do you or does anyone in your family have a serious bleeding problem such as prolonged bleeding following surgeries or cuts? None Known   Have you ever had problems with anemia or been told to take iron pills? No   Have you had any abnormal blood loss such as black, tarry or bloody stools? No   Have you ever had a blood transfusion? No   Are you willing to have a blood transfusion if it is medically needed before, during, or after your surgery? Yes   Have you or any of your relatives ever had problems with  anesthesia? No   Do you have sleep apnea, excessive snoring or daytime drowsiness? No   Do you have any artifical heart valves or other implanted medical devices like a pacemaker, defibrillator, or continuous glucose monitor? No   Do you have artificial joints? No   Are you allergic to latex? (!) YES     Health Care Directive  Patient does not have a Health Care Directive:     Preoperative Review of    reviewed - no record of controlled substances prescribed.          Patient Active Problem List    Diagnosis Date Noted    Dyslipidemia 11/16/2023     Priority: Medium     Formatting of this note might be different from the original. Created by Conversion      Pain due to varicose veins of both lower extremities 07/29/2022     Priority: Medium     Varicose veins of bilateral lower extremities with pain; Status Update Date: 7/29/2022 5:07:22 PMRecord Status: TrueCharge Code ID: I83.813Terminal ID: [id number] Code Version: 10      Umbilical hernia without obstruction and without gangrene 06/23/2020     Priority: Medium     Added automatically from request for surgery [id number]        Depressed mood 05/21/2020     Priority: Medium    Other stricture of bulbous urethra in male      Priority: Medium    Calculus of ureter 07/17/2018     Priority: Medium    Hydronephrosis with urinary obstruction due to ureteral calculus 07/17/2018     Priority: Medium    Calculus of kidney 07/17/2018     Priority: Medium    Abdominal pain 01/07/2015     Priority: Medium    Dyslipidemia      Priority: Medium     Created by Conversion          Past Medical History:   Diagnosis Date    GERD (gastroesophageal reflux disease)     Kidney stones      Past Surgical History:   Procedure Laterality Date    COMBINED CYSTOSCOPY, INSERT STENT URETER(S) Left     urethral dilation, ureteroscopy    MI CYSTO/URETERO W/LITHOTRIPSY &INDWELL STENT INSRT Left 4/30/2019    Procedure: CYSTOSCOPY, LEFT URETEROSCOPY LASER LITHOTRIPSY STENT REMOVAL STENT  "INSERTION;  Surgeon: Frederick Love MD;  Location: [facility] OR;  Service: Urology    RI ESOPHAGOGASTRIC FUNDOPLASTY      Description: Esophagogastric Fundoplasty Nissen Fundoplication;  Recorded: 03/18/2009;     Current Outpatient Medications   Medication Sig Dispense Refill    losartan-hydrochlorothiazide (HYZAAR) 100-12.5 MG tablet Take 1 tablet by mouth daily. 90 tablet 1    omeprazole (PRILOSEC) 20 MG DR capsule Take 20 mg by mouth daily         No Known Allergies     Social History     Tobacco Use    Smoking status: Never    Smokeless tobacco: Never   Substance Use Topics    Alcohol use: Yes     Comment: Alcoholic Drinks/day: occ social drink       History   Drug Use No           Complete review of systems is obtained.  Other than the specific considerations noted above complete review of systems is negative.      Objective    /78   Pulse 87   Temp 98  F (36.7  C) (Oral)   Resp 20   Ht 1.74 m (5' 8.5\")   Wt 98.9 kg (218 lb)   SpO2 97%   BMI 32.66 kg/m     Estimated body mass index is 32.66 kg/m  as calculated from the following:    Height as of this encounter: 1.74 m (5' 8.5\").    Weight as of this encounter: 98.9 kg (218 lb).  Physical Exam        General Appearance:    Alert, cooperative, no distress   Eyes:   No scleral icterus or conjunctival irritation       Ears:    Normal TM's and external ear canals, both ears   Throat:   Lips, mucosa, and tongue normal; teeth and gums normal   Neck:   Supple, symmetrical, trachea midline, no adenopathy;        thyroid:  No enlargement/tenderness/nodules   Lungs:     Clear to auscultation bilaterally, respirations unlabored, no wheezes or crackles   Heart:    Regular rate and rhythm,  No murmur   Abdomen:    Soft, no distention, no tenderness on palpation, no masses, no organomegaly     Extremities:  No edema, no joint swelling or redness, no evidence of any injuries   Skin:  No concerning skin findings, no suspicious moles, no rashes "   Neurologic:  On gross examination there is no motor or sensory deficit.  Patient walks with a normal gait               Recent Labs   Lab Test 02/27/25  1327 02/27/25  1326   HGB  --  17.2   PLT  --  178   NA  --  138   POTASSIUM  --  4.4   CR  --  1.31*   A1C 5.4  --         Diagnostics  Labs pending at this time.  Results will be reviewed when available.   No EKG required, no history of coronary heart disease, significant arrhythmia, peripheral arterial disease or other structural heart disease.    Revised Cardiac Risk Index (RCRI)  The patient has the following serious cardiovascular risks for perioperative complications:   - No serious cardiac risks = 0 points     RCRI Interpretation: 0 points: Class I (very low risk - 0.4% complication rate)         Signed Electronically by: Jose Luis Orbien MD  A copy of this evaluation report is provided to the requesting physician.

## 2025-04-01 ENCOUNTER — TELEPHONE (OUTPATIENT)
Dept: SURGERY | Facility: CLINIC | Age: 40
End: 2025-04-01
Payer: COMMERCIAL

## 2025-04-01 NOTE — LETTER
Thank you for choosing [facility] General Surgery for your care today. You are schedule for surgery with Dr. Hu. Details are as follows:     Pre-op Physical: 03/28/2025 at 2:40 p.m., with Dr. Obrien at [facility]. Arrive at 2:20 p.m. Per anesthesia, failure to complete the required appointment will result in cancellation of surgery.     Surgery Date: 04/18/2025      Location: [place] [address]. Check in on 3rd floor; Left off the elevator     Approximate Arrival Time: 6:45 a.m. (Exact arrival time will be communicated when the pre-op nurse calls to go over fasting instructions/health history with you)      Post op Appointment: No follow up appointment is required after surgery; however, if you have any concerns, please call our office at [phone] and ask to speak with a nurse. Dr. Hu's nurse will call you 3-5 days after surgery to see how you are recovering/healing.      Pre-Surgical Tasks:     Review all medications with your primary care or prescribing physician; they will advise you which meds to stop and when, and when you can resume taking.  Certain medications like blood thinners and weight loss medications need to be stopped in advance of surgery to proceed safely.       Blood thinners including but not exclusive to drugs like Xarelto, Eliquis, Warfarin and Aspirin, should be stopped five days before surgery, if your prescribing provider agrees. Follow your provider's advice on stopping blood thinners because they know you best.  If you are unsure if your medication is a blood thinner, ask your prescribing provider.     Weight loss medications: There are multiple medications being used for weight management and diabetes today, and the list is growing.  Phentermine, Ozempic, Wegovy, Trulicity, and other similar medications need to be stopped one week before surgery to avoid being cancelled.  Victoza and  Saxenda can be continued longer but must be stopped one full day before surgery.  Please ask your prescribing provider for advice.     Diabetic medications: in addition to the medications talked about above that are used for either weight loss or diabetes, some people are on insulin that may require adjustment.  Please discuss managing diabetic medications with your prescribing doctor as these medications may require modification prior to surgery.      Please shower the evening before and morning of surgery with Hibiclens soap. This can be found at your local pharmacy.      Fasting instructions will be provided by the pre-op nurse who will call you 1-3 days before surgery.  Typically, we advise normal food up to 8 hours before you arrive for surgery. Clear liquids only from then until 2 hours before you arrive surgery, then nothing at all by mouth.  The nurse will review your specific instructions with you at the call.       Smoking impacts your body's ability to heal properly so we advise patients to quit if possible before surgery.  Plastic Surgery patients are required to be nicotine free for at least 8 weeks before surgery.       You will need an adult to drive you home and stay with you 24 hours after surgery. Public transportation or Medical Van Services are not permitted.     Visitor restrictions are subject to change, please verify with the pre-op nurse when they call how many people are permitted to accompany you.     We always encourage you to notify your insurance any time you have medical tests or procedures scheduled including surgery. The number is usually right on the back of your insurance card. To obtain pricing for surgery, please call  [employer] [place] [facility] at [phone] or email [email or web address].         Claire Garcia MA  [facility]   (General Surgery/ ENT/ Plastics)  Direct Office: [phone]

## 2025-04-01 NOTE — TELEPHONE ENCOUNTER
Reached out to patient regarding the need to reschedule their surgery from 4/11 to 4/18 due to the provider needing an emergency ISMA.  Left message with the updated information and invited patient to contact me with any questions.  [facility] notified via email  New confirmation letter sent via [employer]

## 2025-04-03 NOTE — TELEPHONE ENCOUNTER
Spoke with Segundo to confirm new date of surgery with Dr. Hu. He will be sending over [facility] paperwork to be updated.

## 2025-04-04 ENCOUNTER — TRANSFERRED RECORDS (OUTPATIENT)
Dept: HEALTH INFORMATION MANAGEMENT | Facility: CLINIC | Age: 40
End: 2025-04-04
Payer: COMMERCIAL

## 2025-04-09 ENCOUNTER — DOCUMENTATION ONLY (OUTPATIENT)
Dept: SURGERY | Facility: CLINIC | Age: 40
End: 2025-04-09
Payer: COMMERCIAL

## 2025-04-09 ENCOUNTER — TELEPHONE (OUTPATIENT)
Dept: VASCULAR SURGERY | Facility: CLINIC | Age: 40
End: 2025-04-09
Payer: COMMERCIAL

## 2025-04-09 NOTE — TELEPHONE ENCOUNTER
Left pt message that forms completed and faxed.    Elizabeth RIVAS   RN, BSN    [facility]  General Surgery  [facility] [place] [address]  Office: [phone]  Employed by [facility]

## 2025-04-09 NOTE — TELEPHONE ENCOUNTER
Patient came to drop off the [facility] paper to be fill out by Dewey Hu    Any questions or concerns, patient can be reach at [phone]

## 2025-04-09 NOTE — PROGRESS NOTES
Short term disability forms received, completed and signed on providers behalf.    Leave start date: 04/18/2025    Leave end date: 05/09/2025    RTW on 05/09/2025.    Forms faxed to [employer] at [phone]. Fax successful.    Forms labeled and sent to HIM for scanning.    Elizabeth RIVAS RN, BSN    [facility]  General Surgery  [address] [place], [place] [id number]  Office: [phone]  Employed by [facility]

## 2025-04-18 ENCOUNTER — TELEPHONE (OUTPATIENT)
Dept: SURGERY | Facility: CLINIC | Age: 40
End: 2025-04-18

## 2025-04-18 ENCOUNTER — HOSPITAL ENCOUNTER (OUTPATIENT)
Facility: AMBULATORY SURGERY CENTER | Age: 40
Discharge: HOME | End: 2025-04-18
Attending: SPECIALIST
Payer: COMMERCIAL

## 2025-04-18 VITALS
DIASTOLIC BLOOD PRESSURE: 85 MMHG | RESPIRATION RATE: 16 BRPM | HEART RATE: 68 BPM | WEIGHT: 220 LBS | BODY MASS INDEX: 32.96 KG/M2 | TEMPERATURE: 96.8 F | OXYGEN SATURATION: 95 % | SYSTOLIC BLOOD PRESSURE: 128 MMHG

## 2025-04-18 DIAGNOSIS — K42.9 UMBILICAL HERNIA WITHOUT OBSTRUCTION AND WITHOUT GANGRENE: Primary | ICD-10-CM

## 2025-04-18 PROCEDURE — 49591 RPR AA HRN 1ST < 3 CM RDC: CPT | Performed by: SPECIALIST

## 2025-04-18 RX ORDER — HYDROCODONE BITARTRATE AND ACETAMINOPHEN 5; 325 MG/1; MG/1
1 TABLET ORAL EVERY 6 HOURS PRN
Qty: 12 TABLET | Refills: 0 | Status: SHIPPED | OUTPATIENT
Start: 2025-04-18 | End: 2025-04-21

## 2025-04-18 RX ORDER — DEXAMETHASONE SODIUM PHOSPHATE 4 MG/ML
4 INJECTION, SOLUTION INTRA-ARTICULAR; INTRALESIONAL; INTRAMUSCULAR; INTRAVENOUS; SOFT TISSUE
Status: DISCONTINUED | OUTPATIENT
Start: 2025-04-18 | End: 2025-04-19 | Stop reason: HOSPADM

## 2025-04-18 RX ORDER — NALOXONE HYDROCHLORIDE 0.4 MG/ML
0.1 INJECTION, SOLUTION INTRAMUSCULAR; INTRAVENOUS; SUBCUTANEOUS
Status: DISCONTINUED | OUTPATIENT
Start: 2025-04-18 | End: 2025-04-19 | Stop reason: HOSPADM

## 2025-04-18 RX ORDER — ACETAMINOPHEN 325 MG/1
975 TABLET ORAL ONCE
Status: COMPLETED | OUTPATIENT
Start: 2025-04-18 | End: 2025-04-18

## 2025-04-18 RX ORDER — ONDANSETRON 4 MG/1
4 TABLET, ORALLY DISINTEGRATING ORAL EVERY 30 MIN PRN
Status: DISCONTINUED | OUTPATIENT
Start: 2025-04-18 | End: 2025-04-19 | Stop reason: HOSPADM

## 2025-04-18 RX ORDER — CEFAZOLIN SODIUM 2 G/100ML
2 INJECTION, SOLUTION INTRAVENOUS SEE ADMIN INSTRUCTIONS
Status: DISCONTINUED | OUTPATIENT
Start: 2025-04-18 | End: 2025-04-19 | Stop reason: HOSPADM

## 2025-04-18 RX ORDER — SODIUM CHLORIDE, SODIUM LACTATE, POTASSIUM CHLORIDE, CALCIUM CHLORIDE 600; 310; 30; 20 MG/100ML; MG/100ML; MG/100ML; MG/100ML
INJECTION, SOLUTION INTRAVENOUS CONTINUOUS
Status: DISCONTINUED | OUTPATIENT
Start: 2025-04-18 | End: 2025-04-19 | Stop reason: HOSPADM

## 2025-04-18 RX ORDER — ONDANSETRON 2 MG/ML
4 INJECTION INTRAMUSCULAR; INTRAVENOUS EVERY 30 MIN PRN
Status: DISCONTINUED | OUTPATIENT
Start: 2025-04-18 | End: 2025-04-19 | Stop reason: HOSPADM

## 2025-04-18 RX ORDER — FENTANYL CITRATE 0.05 MG/ML
25 INJECTION, SOLUTION INTRAMUSCULAR; INTRAVENOUS
Status: DISCONTINUED | OUTPATIENT
Start: 2025-04-18 | End: 2025-04-19 | Stop reason: HOSPADM

## 2025-04-18 RX ORDER — OXYCODONE HYDROCHLORIDE 10 MG/1
10 TABLET ORAL
Status: DISCONTINUED | OUTPATIENT
Start: 2025-04-18 | End: 2025-04-19 | Stop reason: HOSPADM

## 2025-04-18 RX ORDER — CEFAZOLIN SODIUM 2 G/100ML
2 INJECTION, SOLUTION INTRAVENOUS
Status: COMPLETED | OUTPATIENT
Start: 2025-04-18 | End: 2025-04-18

## 2025-04-18 RX ORDER — LIDOCAINE 40 MG/G
CREAM TOPICAL
Status: DISCONTINUED | OUTPATIENT
Start: 2025-04-18 | End: 2025-04-19 | Stop reason: HOSPADM

## 2025-04-18 RX ORDER — OXYCODONE HYDROCHLORIDE 5 MG/1
5 TABLET ORAL
Status: DISCONTINUED | OUTPATIENT
Start: 2025-04-18 | End: 2025-04-19 | Stop reason: HOSPADM

## 2025-04-18 RX ADMIN — SODIUM CHLORIDE, SODIUM LACTATE, POTASSIUM CHLORIDE, CALCIUM CHLORIDE: 600; 310; 30; 20 INJECTION, SOLUTION INTRAVENOUS at 08:34

## 2025-04-18 RX ADMIN — ACETAMINOPHEN 975 MG: 325 TABLET ORAL at 08:17

## 2025-04-18 NOTE — INTERVAL H&P NOTE
"I have reviewed the surgical (or preoperative) H&P that is linked to this encounter, and examined the patient. There are no significant changes.        Addison Hu MD  General Surgery [phone]  Vascular Surgery [phone]          Clinical Conditions Present on Arrival:  Clinically Significant Risk Factors Present on Admission         # Hyponatremia: Lowest Na = 134 mmol/L in last 30 days, will monitor as appropriate               # Obesity: Estimated body mass index is 32.96 kg/m  as calculated from the following:    Height as of 3/28/25: 1.74 m (5' 8.5\").    Weight as of this encounter: 99.8 kg (220 lb).       "

## 2025-04-18 NOTE — TELEPHONE ENCOUNTER
Patient's wife dropped off paperwork for Dr. Hu to fill out. Please call once form is completed.    Paperwork will be up front in [employer] INCOMING  Gen Surg folder.

## 2025-04-18 NOTE — OP NOTE
Operative Note    Name:  Segundo Wilson  PCP:  Frederick Ray  Procedure Date:  4/18/2025      Procedure(s):  HERNIORRHAPHY, UMBILICAL, OPEN    Pre-Procedure Diagnosis:  Umbilical hernia without obstruction and without gangrene [K42.9]    Post-Procedure Diagnosis:    umbilical hernia    OR staff:  Surgeon(s):  Addison Hu MD    Circulator: Zoe Munoz RN  Scrub Person: Alyse Pettit      Anesthesia Type:  MAC with Local    Past Medical History:   Diagnosis Date    GERD (gastroesophageal reflux disease)     Hypertension     Kidney stones        Patient Active Problem List    Diagnosis Date Noted    Dyslipidemia 11/16/2023     Priority: Medium     Formatting of this note might be different from the original. Created by Conversion      Pain due to varicose veins of both lower extremities 07/29/2022     Priority: Medium     Varicose veins of bilateral lower extremities with pain; Status Update Date: 7/29/2022 5:07:22 PMRecord Status: TrueCharge Code ID: I83.813Terminal ID: 52ICD Code Version: 10      Umbilical hernia without obstruction and without gangrene 06/23/2020     Priority: Medium     Added automatically from request for surgery [id number]        Depressed mood 05/21/2020     Priority: Medium    Other stricture of bulbous urethra in male      Priority: Medium    Calculus of ureter 07/17/2018     Priority: Medium    Hydronephrosis with urinary obstruction due to ureteral calculus 07/17/2018     Priority: Medium    Calculus of kidney 07/17/2018     Priority: Medium    Abdominal pain 01/07/2015     Priority: Medium    Dyslipidemia      Priority: Medium     Created by Conversion           Findings:  Defect 1 cm x 2 cmrepaired primarily    Operative Report:    Consent was obtained.  The patient was taken to the operating room and placed in a supine position.  MAC anesthesia was administered by anesthesia staff.  Patient's abdomen prepped and draped sterile manner.  Briefing timeout was performed anesthesia  and our staff.  I began by infiltrating local anesthetics.  Umbilicus in the linear fashion and a field block of the area.  A linear incision was made superiorly, lined up with his prior epigastric incision.  I then dissected down around the hernia sac and resected this at the fascial base after full dissection.  I then reduced the contents which were omentum back intra-abdominal he.  The defect was about 1 x 2 cm with quite a bit a large amount of fat that had protruded through the defect this after reduction it closed nicely with a figure-of-eight 0-0 Ethibond suture x 2 I closed the deep layers with 3-0 Vicryl suture in the skin with a 4 Monocryl subcuticular stitch.  Steri-Strips Ultracin applied transferred to PACU in stable condition all sponge and needle counts are correct.      Estimated Blood Loss: 5 ml    Specimens:    None sent       Drains:   none    Complications:    None    Addison Hu MD     Date: 4/18/2025  Time: 10:08 AM

## 2025-04-18 NOTE — DISCHARGE INSTRUCTIONS
If you have any questions or concerns regarding your procedure please contact Dr. Hu, his office number is [phone]    You have received 975 mg of Acetaminophen (Tylenol) at 8:17am. Please do not take an additional dose of Tylenol until after 2:17pm.     Do not exceed 4,000 mg of acetaminophen during a 24 hour period and keep in mind that acetaminophen can also be found in many over-the-counter cold medications as well as narcotics that may be given for pain.     You received a medication called Toradol (a stronger IV ibuprofen) at 9:45am. Do NOT take any Ibuprofen / Advil / Aleve / Naproxen or products containing Ibuprofen until 3:45pm or later.    [facility]   Adult Discharge Orders & Instructions     For 24 hours after surgery    Get plenty of rest.  A responsible adult must stay with you for at least 24 hours after you leave the hospital.   Do not drive or use heavy equipment.  If you have weakness or tingling, don't drive or use heavy equipment until this feeling goes away.  Do not drink alcohol.  Avoid strenuous or risky activities.  Ask for help when climbing stairs.   You may feel lightheaded.  IF so, sit for a few minutes before standing.  Have someone help you get up.   If you have nausea (feel sick to your stomach): Drink only clear liquids such as apple juice, ginger ale, broth or 7-Up.  Rest may also help.  Be sure to drink enough fluids.  Move to a regular diet as you feel able.  You may have a slight fever. Call the doctor if your fever is over 100 F (37.7 C) (taken under the tongue) or lasts longer than 24 hours.  You may have a dry mouth, a sore throat, muscle aches or trouble sleeping.  These should go away after 24 hours.  Do not make important or legal decisions.   Call your doctor for any of the followin.  Signs of infection (fever, growing tenderness at the surgery site, a large amount of drainage or bleeding, severe pain, foul-smelling drainage, redness,  swelling).    2. It has been over 8 to 10 hours since surgery and you are still not able to urinate (pass water).    3.  Headache for over 24 hours.

## 2025-04-21 ENCOUNTER — TELEPHONE (OUTPATIENT)
Dept: SURGERY | Facility: CLINIC | Age: 40
End: 2025-04-21
Payer: COMMERCIAL

## 2025-04-21 NOTE — TELEPHONE ENCOUNTER
[facility] Post-Op Phone Call                     Surgeon: Addison Hu    Date of Surgery: 4/18/25  Surgery: Umbilical Hernia Repair  Discharge Date: 4/18/25    Date/Time Called:   Date: 4/21/2025 Time: 10:20 AM   Attempt: First    Attempted to contact patient to check on them post-operatively. Left a voicemail to call back with any questions or concerns.          Thank you for your time. Please do not hesitate to call us with any questions or concerns.    Call completed by: Denisse Arias RN

## 2025-08-21 DIAGNOSIS — I10 ESSENTIAL HYPERTENSION: ICD-10-CM

## 2025-08-21 RX ORDER — LOSARTAN POTASSIUM AND HYDROCHLOROTHIAZIDE 12.5; 1 MG/1; MG/1
1 TABLET ORAL DAILY
Qty: 90 TABLET | Refills: 1 | Status: SHIPPED | OUTPATIENT
Start: 2025-08-21